# Patient Record
Sex: FEMALE | Race: WHITE | Employment: UNEMPLOYED | URBAN - NONMETROPOLITAN AREA
[De-identification: names, ages, dates, MRNs, and addresses within clinical notes are randomized per-mention and may not be internally consistent; named-entity substitution may affect disease eponyms.]

---

## 2017-01-10 ENCOUNTER — TELEPHONE (OUTPATIENT)
Dept: PSYCHIATRY | Age: 47
End: 2017-01-10

## 2018-03-12 ENCOUNTER — OFFICE VISIT (OUTPATIENT)
Dept: CARDIOLOGY | Age: 48
End: 2018-03-12
Payer: COMMERCIAL

## 2018-03-12 VITALS
WEIGHT: 193 LBS | HEART RATE: 82 BPM | DIASTOLIC BLOOD PRESSURE: 66 MMHG | SYSTOLIC BLOOD PRESSURE: 112 MMHG | BODY MASS INDEX: 31.02 KG/M2 | HEIGHT: 66 IN

## 2018-03-12 DIAGNOSIS — R00.2 PALPITATIONS: ICD-10-CM

## 2018-03-12 DIAGNOSIS — E78.2 MIXED HYPERLIPIDEMIA: ICD-10-CM

## 2018-03-12 DIAGNOSIS — R73.03 PRE-DIABETES: ICD-10-CM

## 2018-03-12 DIAGNOSIS — Z82.49 FAMILY HISTORY OF HEART DISEASE: ICD-10-CM

## 2018-03-12 DIAGNOSIS — R07.9 CHEST PAIN IN ADULT: Primary | ICD-10-CM

## 2018-03-12 LAB — TSH SERPL DL<=0.05 MIU/L-ACNC: 0.8 UIU/ML (ref 0.27–4.2)

## 2018-03-12 PROCEDURE — 93000 ELECTROCARDIOGRAM COMPLETE: CPT | Performed by: CLINICAL NURSE SPECIALIST

## 2018-03-12 PROCEDURE — 99204 OFFICE O/P NEW MOD 45 MIN: CPT | Performed by: CLINICAL NURSE SPECIALIST

## 2018-03-12 RX ORDER — GABAPENTIN 300 MG/1
300 CAPSULE ORAL 4 TIMES DAILY
COMMUNITY
End: 2020-01-04 | Stop reason: ALTCHOICE

## 2018-03-12 RX ORDER — GABAPENTIN 800 MG/1
800 TABLET ORAL 2 TIMES DAILY
COMMUNITY

## 2018-03-12 ASSESSMENT — ENCOUNTER SYMPTOMS
BLURRED VISION: 0
COUGH: 0
ORTHOPNEA: 0
NAUSEA: 0
HEARTBURN: 0
SHORTNESS OF BREATH: 1

## 2018-03-12 NOTE — PROGRESS NOTES
[facility], [address] [facility] [address] [place] [address]  Phone: [phone]  Fax: [phone]    OFFICE VISIT:  3/12/2018    Feliciano Cody - : 1970    Reason For Visit:  Dioni is a 49 y.o. female who is here for New Patient (states she was having chest pain with SOB)    HPI   Patient is referred by her PCP for complaints of tightness in her chest that radiates through to her back and sometimes up to her right neck. She has associated shortness of breath and diaphoresis when this occurs. She feels it more when she is exerting herself. Upon questioning her further she is complaining of fast heart rate and palpitations which seem to be associated with these episodes as well. Episodes can last several minutes. She states palpitations radiate into her throat and then she feels a heaviness. She states in general she is more short of breath and can't climb stairs as easily as she used to. Cardiac risk factors include smoking, prediabetes, hyperlipidemia, and family history of heart disease    Cheyenne Boateng is PCP.   Feliciano Cody has the following history as recorded in [facility]:    Patient Active Problem List    Diagnosis Date Noted    Mixed hyperlipidemia 2018    Pre-diabetes 2018    Family history of heart disease 2018     Past Medical History:   Diagnosis Date    Anxiety     Asthma     Chronic back pain     Depression     Family history of heart disease 3/12/2018    Fibromyalgia     GERD (gastroesophageal reflux disease)     Headache(784.0)     Irritable bowel syndrome     with constipation    Mixed hyperlipidemia 3/12/2018    Neuropathy ([address]     Osteoarthritis     Pre-diabetes 3/12/2018    Type II or unspecified type diabetes mellitus without mention of complication, not stated as uncontrolled      Past Surgical History:   Procedure Laterality Date    APPENDECTOMY      CARDIAC CATHETERIZATION      COLONOSCOPY      GASTRIC BYPASS SURGERY  2003    Dr. Maribell Graham   [address] [facility] [address]      TUBAL LIGATION      UPPER GASTROINTESTINAL ENDOSCOPY       Family History   Problem Relation Age of Onset    Depression Mother     Hypertension Mother     Depression Sister     Heart Disease Father     Heart Attack Paternal Grandmother     Stroke Paternal Grandmother     Heart Attack Paternal Grandfather     Stroke Paternal Grandfather      Social History   Substance Use Topics    Smoking status: Current Every Day Smoker     Packs/day: 2.00    Smokeless tobacco: Never Used    Alcohol use No      Current Outpatient Prescriptions   Medication Sig Dispense Refill    gabapentin (NEURONTIN) 800 MG tablet Take 800 mg by mouth 2 times daily.  gabapentin (NEURONTIN) 300 MG capsule Take 300 mg by mouth 4 times daily.  QUEtiapine (SEROQUEL) 400 MG tablet Take one tablet at bedtime 60 tablet 2    prazosin (MINIPRESS) 1 MG capsule Take 3 capsules at bedtime 90 capsule 2    sertraline (ZOLOFT) 100 MG tablet TAKE 1 AND 1/2 TABLETS BY MOUTH EVERY NIGHT AT BEDTIME 45 tablet 2    albuterol (VENTOLIN HFA) 108 (90 BASE) MCG/ACT inhaler Inhale 2 puffs into the lungs as needed (asthma).  vitamin D (ERGOCALCIFEROL) 50775 UNITS CAPS capsule Take 50,000 Units by mouth once a week.  topiramate (TOPAMAX) 100 MG tablet Take 100 mg by mouth 2 times daily.  traMADol (ULTRAM) 50 MG tablet Take 50 mg by mouth every 6 hours as needed for Pain. No current facility-administered medications for this visit. Allergies: Dilaudid [hydromorphone hcl] and Pcn [penicillins]    Review of Systems  Review of Systems   Constitutional: Negative for chills, fever and malaise/fatigue. HENT: Negative for hearing loss and nosebleeds. Eyes: Negative for blurred vision. Respiratory: Positive for shortness of breath. Negative for cough. Cardiovascular: Positive for chest pain and palpitations.  Negative for orthopnea, leg swelling

## 2018-03-13 ENCOUNTER — TELEPHONE (OUTPATIENT)
Dept: CARDIOLOGY | Age: 48
End: 2018-03-13

## 2018-03-21 ENCOUNTER — HOSPITAL ENCOUNTER (OUTPATIENT)
Dept: NON INVASIVE DIAGNOSTICS | Age: 48
Discharge: HOME | End: 2018-03-21
Payer: COMMERCIAL

## 2018-03-21 ENCOUNTER — TELEPHONE (OUTPATIENT)
Dept: CARDIOLOGY | Age: 48
End: 2018-03-21

## 2018-03-21 DIAGNOSIS — R00.2 PALPITATIONS: ICD-10-CM

## 2018-03-21 DIAGNOSIS — R07.9 CHEST PAIN IN ADULT: ICD-10-CM

## 2018-03-21 LAB
LV EF: 50 %
LV EF: 58 %
LVEF MODALITY: NORMAL
LVEF MODALITY: NORMAL

## 2018-03-21 PROCEDURE — 93306 TTE W/DOPPLER COMPLETE: CPT

## 2018-03-21 PROCEDURE — 93350 STRESS TTE ONLY: CPT

## 2018-03-28 ENCOUNTER — OFFICE VISIT (OUTPATIENT)
Dept: CARDIOLOGY | Age: 48
End: 2018-03-28
Payer: COMMERCIAL

## 2018-03-28 VITALS
WEIGHT: 196 LBS | HEART RATE: 70 BPM | BODY MASS INDEX: 31.5 KG/M2 | DIASTOLIC BLOOD PRESSURE: 62 MMHG | SYSTOLIC BLOOD PRESSURE: 112 MMHG | HEIGHT: 66 IN

## 2018-03-28 DIAGNOSIS — R00.2 PALPITATIONS: ICD-10-CM

## 2018-03-28 DIAGNOSIS — R07.89 OTHER CHEST PAIN: ICD-10-CM

## 2018-03-28 DIAGNOSIS — R94.39 ABNORMAL STRESS ECHO: Primary | ICD-10-CM

## 2018-03-28 DIAGNOSIS — Z01.818 PRE-OP EVALUATION: ICD-10-CM

## 2018-03-28 PROCEDURE — 99213 OFFICE O/P EST LOW 20 MIN: CPT | Performed by: CLINICAL NURSE SPECIALIST

## 2018-03-28 RX ORDER — PNV NO.95/FERROUS FUM/FOLIC AC 28MG-0.8MG
325 TABLET ORAL DAILY
COMMUNITY
End: 2020-12-30

## 2018-03-28 RX ORDER — OMEPRAZOLE 20 MG/1
20 CAPSULE, DELAYED RELEASE ORAL 2 TIMES DAILY
COMMUNITY
Start: 2018-03-24

## 2018-03-28 ASSESSMENT — ENCOUNTER SYMPTOMS
BLURRED VISION: 0
SHORTNESS OF BREATH: 1
ORTHOPNEA: 0
HEARTBURN: 0
COUGH: 0
NAUSEA: 0

## 2018-03-28 NOTE — PATIENT INSTRUCTIONS
of the groin or arm where the catheter will be inserted is shaved, cleaned, and numbed. A needle will be inserted into a blood vessel. A wire will be passed through the needle and into the blood vessel. The wire will then be guided through until it reaches your heart. A soft, flexible catheter tube will then be slipped over the wire and threaded up to your heart. The doctor will be taking x-ray pictures during the procedure to know where the wire and catheter are. Dye will be injected into the arteries of the heart. This will make the arteries and heart show up on the x-ray images. You may feel warm during the dye injection. Insertion of Catheter with Guide Wire    Once in place, the catheter can be used to take measurements. Blood pressure can be taken within the heart's different chambers. Blood samples may also be taken. Multiple x-ray images will be taken to look for any disease in the arteries. An aortogram may also be done at this time. This step will give a clear image of the aorta (large artery leaving the heart). Once all the tests and images are complete, the catheter will be removed. Sometimes, the doctor will perform balloon angioplasty and stenting if he finds an area in your arteries that is narrow or clogged. These are procedures that help to open narrowed arteries. Finally, a bandage will be placed over the groin or arm area. How Long Will It Take? The procedure takes about 1-2 hours. Preparation before the test will take another 1-2 hours. How Much Will It Hurt? Although the procedure is generally not painful, it can cause some discomfort, including:   Burning sensation (when skin at catheter insertion site is anesthetized)   Pressure when catheter is inserted or replaced with other catheters   A flushing feeling or nausea when the dye is injected   Headache   Heart palpitations   Pain medicine will be given when needed.      Average Hospital Stay:  0-1 days     Postoperative Care

## 2018-03-28 NOTE — PROGRESS NOTES
change and focal weakness. Endo/Heme/Allergies: Does not bruise/bleed easily. Psychiatric/Behavioral: Negative for depression. The patient is nervous/anxious. Objective  Vital Signs - /62   Pulse 70   Ht 5' 6\" (1.676 m)   Wt 196 lb (88.9 kg)   BMI 31.64 kg/m²   Physical Exam   Constitutional: She is oriented to person, place, and time. She appears well-developed and well-nourished. No distress. HENT:   Head: Normocephalic and atraumatic. Eyes: Pupils are equal, round, and reactive to light. Right eye exhibits no discharge. Left eye exhibits no discharge. Neck: No JVD present. No tracheal deviation present. Cardiovascular: Normal rate, regular rhythm, normal heart sounds and intact distal pulses. Exam reveals no gallop and no friction rub. No murmur heard. No carotid bruit   Pulmonary/Chest: Effort normal and breath sounds normal. No respiratory distress. She has no wheezes. She has no rales. Abdominal: Soft. There is no tenderness. Musculoskeletal: She exhibits no edema. Normal gait and station   Neurological: She is alert and oriented to person, place, and time. No cranial nerve deficit. Skin: Skin is warm and dry. No rash noted. Psychiatric: She has a normal mood and affect. Her behavior is normal. Judgment normal.   Nursing note and vitals reviewed. Assessment:    1. Abnormal stress echo     2. Other chest pain     3. Palpitations     4. Pre-op evaluation  CBC    Basic Metabolic Panel    XR Chest PA and Lateral     Patient is taking medications as prescribed    Stress Echo 3/21/18  Summary   Positive stress echocardiogram with clinical evidence of myocardial   ischemia. ECG and echocardiographically negative.   Tillman Treadmill Score was 5.0 .  Mil Alberts is a low risk of myocardial ischemia.   Test was supervised by Dr. Natasha Little.     Echo 3/21/18  Summary   Mitral valve leaflets are mildly thickened with preserved leaflet   mobility.   Mild mitral regurgitation is

## 2018-03-29 ENCOUNTER — TELEPHONE (OUTPATIENT)
Dept: CARDIOLOGY | Age: 48
End: 2018-03-29

## 2018-03-29 NOTE — TELEPHONE ENCOUNTER
Called patient again to scheduled cardiac cath and she said she had changed her mind and did not want to have a cardiac catheterization done. Advised her that I would let Juanita People know that she had changed her mind about proceeding with the cath.

## 2020-01-04 ENCOUNTER — HOSPITAL ENCOUNTER (EMERGENCY)
Age: 50
Discharge: HOME | End: 2020-01-05
Attending: EMERGENCY MEDICINE
Payer: COMMERCIAL

## 2020-01-04 ENCOUNTER — APPOINTMENT (OUTPATIENT)
Dept: GENERAL RADIOLOGY | Age: 50
End: 2020-01-04
Payer: COMMERCIAL

## 2020-01-04 LAB
ALBUMIN SERPL-MCNC: 3.7 G/DL (ref 3.5–5.2)
ALP BLD-CCNC: 74 U/L (ref 35–104)
ALT SERPL-CCNC: 10 U/L (ref 5–33)
ANION GAP SERPL CALCULATED.3IONS-SCNC: 12 MMOL/L (ref 7–19)
AST SERPL-CCNC: 12 U/L (ref 5–32)
BILIRUB SERPL-MCNC: <0.2 MG/DL (ref 0.2–1.2)
BILIRUBIN URINE: NEGATIVE
BLOOD, URINE: NEGATIVE
BUN BLDV-MCNC: 7 MG/DL (ref 6–20)
CALCIUM SERPL-MCNC: 7.7 MG/DL (ref 8.6–10)
CHLORIDE BLD-SCNC: 104 MMOL/L (ref 98–111)
CLARITY: CLEAR
CO2: 21 MMOL/L (ref 22–29)
COLOR: YELLOW
CREAT SERPL-MCNC: 0.9 MG/DL (ref 0.5–0.9)
GFR NON-AFRICAN AMERICAN: >60
GLUCOSE BLD-MCNC: 103 MG/DL (ref 74–109)
GLUCOSE URINE: NEGATIVE MG/DL
KETONES, URINE: NEGATIVE MG/DL
LEUKOCYTE ESTERASE, URINE: NEGATIVE
NITRITE, URINE: NEGATIVE
PH UA: 6 (ref 5–8)
POTASSIUM SERPL-SCNC: 3.8 MMOL/L (ref 3.5–5)
PROTEIN UA: NEGATIVE MG/DL
SODIUM BLD-SCNC: 137 MMOL/L (ref 136–145)
SPECIFIC GRAVITY UA: 1.01 (ref 1–1.03)
TOTAL PROTEIN: 6.3 G/DL (ref 6.6–8.7)
URINE REFLEX TO CULTURE: NORMAL
UROBILINOGEN, URINE: 0.2 E.U./DL

## 2020-01-04 PROCEDURE — 81003 URINALYSIS AUTO W/O SCOPE: CPT

## 2020-01-04 PROCEDURE — 80053 COMPREHEN METABOLIC PANEL: CPT

## 2020-01-04 PROCEDURE — 99284 EMERGENCY DEPT VISIT MOD MDM: CPT

## 2020-01-04 PROCEDURE — 71046 X-RAY EXAM CHEST 2 VIEWS: CPT

## 2020-01-04 PROCEDURE — 36415 COLL VENOUS BLD VENIPUNCTURE: CPT

## 2020-01-04 PROCEDURE — 93005 ELECTROCARDIOGRAM TRACING: CPT | Performed by: EMERGENCY MEDICINE

## 2020-01-04 PROCEDURE — 85025 COMPLETE CBC W/AUTO DIFF WBC: CPT

## 2020-01-04 RX ORDER — SODIUM CHLORIDE, SODIUM LACTATE, POTASSIUM CHLORIDE, AND CALCIUM CHLORIDE .6; .31; .03; .02 G/100ML; G/100ML; G/100ML; G/100ML
1000 INJECTION, SOLUTION INTRAVENOUS ONCE
Status: DISCONTINUED | OUTPATIENT
Start: 2020-01-04 | End: 2020-01-05

## 2020-01-04 ASSESSMENT — ENCOUNTER SYMPTOMS
PHOTOPHOBIA: 0
WHEEZING: 0
COLOR CHANGE: 0
SORE THROAT: 1
CHEST TIGHTNESS: 0
VOMITING: 0
BACK PAIN: 0
COUGH: 0
RHINORRHEA: 0
SHORTNESS OF BREATH: 1
DIARRHEA: 0
CONSTIPATION: 0
ABDOMINAL DISTENTION: 0
TROUBLE SWALLOWING: 0
NAUSEA: 0
EYE PAIN: 0
ABDOMINAL PAIN: 0

## 2020-01-05 VITALS
HEIGHT: 67 IN | OXYGEN SATURATION: 93 % | WEIGHT: 200 LBS | RESPIRATION RATE: 13 BRPM | BODY MASS INDEX: 31.39 KG/M2 | DIASTOLIC BLOOD PRESSURE: 62 MMHG | HEART RATE: 72 BPM | SYSTOLIC BLOOD PRESSURE: 104 MMHG | TEMPERATURE: 99 F

## 2020-01-05 LAB
ANISOCYTOSIS: ABNORMAL
BASOPHILS ABSOLUTE: 0.1 K/UL (ref 0–0.2)
BASOPHILS RELATIVE PERCENT: 0.8 % (ref 0–1)
EOSINOPHILS ABSOLUTE: 0 K/UL (ref 0–0.6)
EOSINOPHILS RELATIVE PERCENT: 0.4 % (ref 0–5)
HCT VFR BLD CALC: 38.2 % (ref 37–47)
HEMOGLOBIN: 11 G/DL (ref 12–16)
HYPOCHROMIA: ABNORMAL
IMMATURE GRANULOCYTES #: 0 K/UL
LYMPHOCYTES ABSOLUTE: 1.8 K/UL (ref 1.1–4.5)
LYMPHOCYTES RELATIVE PERCENT: 18.7 % (ref 20–40)
MCH RBC QN AUTO: 22.4 PG (ref 27–31)
MCHC RBC AUTO-ENTMCNC: 28.8 G/DL (ref 33–37)
MCV RBC AUTO: 78 FL (ref 81–99)
MICROCYTES: ABNORMAL
MONOCYTES ABSOLUTE: 0.5 K/UL (ref 0–0.9)
MONOCYTES RELATIVE PERCENT: 4.7 % (ref 0–10)
NEUTROPHILS ABSOLUTE: 7.3 K/UL (ref 1.5–7.5)
NEUTROPHILS RELATIVE PERCENT: 75 % (ref 50–65)
OVALOCYTES: ABNORMAL
PDW BLD-RTO: 18.7 % (ref 11.5–14.5)
PLATELET # BLD: 210 K/UL (ref 130–400)
PLATELET SLIDE REVIEW: ADEQUATE
PMV BLD AUTO: 10.6 FL (ref 9.4–12.3)
POLYCHROMASIA: ABNORMAL
RBC # BLD: 4.9 M/UL (ref 4.2–5.4)
WBC # BLD: 9.7 K/UL (ref 4.8–10.8)

## 2020-01-05 NOTE — ED NOTES
Bed: 17  Expected date:   Expected time:   Means of arrival:   Comments:  EMS: syncope     Alyssa Todd RN  01/04/20 2032

## 2020-01-05 NOTE — ED PROVIDER NOTES
[hydromorphone hcl] and Pcn [penicillins]    FAMILY HISTORY       Family History   Problem Relation Age of Onset    Depression Mother     Hypertension Mother     Depression Sister     Heart Disease Father     Heart Attack Paternal Grandmother     Stroke Paternal Grandmother     Heart Attack Paternal Grandfather     Stroke Paternal Grandfather           SOCIAL HISTORY       Social History     Socioeconomic History    Marital status:      Spouse name: None    Number of children: None    Years of education: None    Highest education level: None   Occupational History    None   Social Needs    Financial resource strain: None    Food insecurity:     Worry: None     Inability: None    Transportation needs:     Medical: None     Non-medical: None   Tobacco Use    Smoking status: Current Every Day Smoker     Packs/day: 2.00    Smokeless tobacco: Never Used   Substance and Sexual Activity    Alcohol use: No    Drug use: No    Sexual activity: None   Lifestyle    Physical activity:     Days per week: None     Minutes per session: None    Stress: None   Relationships    Social connections:     Talks on phone: None     Gets together: None     Attends Gnosticism service: None     Active member of club or organization: None     Attends meetings of clubs or organizations: None     Relationship status: None    Intimate partner violence:     Fear of current or ex partner: None     Emotionally abused: None     Physically abused: None     Forced sexual activity: None   Other Topics Concern    None   Social History Narrative    None       SCREENINGS             PHYSICAL EXAM    (up to 7 for level 4, 8 or more for level 5)     ED Triage Vitals   BP Temp Temp src Pulse Resp SpO2 Height Weight   01/04/20 2032 01/04/20 2035 -- 01/04/20 2032 01/04/20 2032 01/04/20 2032 01/04/20 2032 01/04/20 2032   93/65 99 °F (37.2 °C)  87 17 95 % 5' 7\" (1.702 m) 200 lb (90.7 kg)       Physical Exam  Vitals signs and nursing note reviewed. Constitutional:       General: She is not in acute distress. Appearance: Normal appearance. HENT:      Head: Normocephalic and atraumatic. Nose: Nose normal.      Mouth/Throat:      Mouth: Mucous membranes are moist.      Pharynx: Oropharynx is clear. Eyes:      Extraocular Movements: Extraocular movements intact. Pupils: Pupils are equal, round, and reactive to light. Cardiovascular:      Rate and Rhythm: Normal rate and regular rhythm. Heart sounds: No murmur. Pulmonary:      Effort: Pulmonary effort is normal.      Breath sounds: Normal breath sounds. No wheezing, rhonchi or rales. Abdominal:      General: Abdomen is flat. Palpations: Abdomen is soft. Tenderness: There is no tenderness. There is no guarding or rebound. Skin:     General: Skin is warm and dry. Capillary Refill: Capillary refill takes less than 2 seconds. Neurological:      General: No focal deficit present. Mental Status: She is alert. She is disoriented. Cranial Nerves: No cranial nerve deficit. Psychiatric:         Mood and Affect: Mood normal.         Behavior: Behavior normal.         Thought Content: Thought content normal.         DIAGNOSTIC RESULTS     EKG: All EKG's areinterpreted by the Emergency Department Physician who either signs or Co-signs this chart in the absence of a cardiologist.    Normal sinus rhythm with a rate 82, normal axis, no acute ST elevations or depressions. RADIOLOGY:  Non-plain film images such as CT, Ultrasound and MRI are read by the radiologist. Plain radiographic images are visualized and preliminarily interpreted bythe emergency physician with the below findings:      XR CHEST STANDARD (2 VW)   Final Result   1. Slightly diminished level of inspiration with mild basilar   atelectasis, otherwise no acute process identified.    Signed by Dr Drew Kaur on 1/4/2020 8:56 PM              LABS:  Labs Reviewed   CBC WITH AUTO DIFFERENTIAL - Abnormal; Notable for the following components:       Result Value    Hemoglobin 11.0 (*)     MCV 78.0 (*)     MCH 22.4 (*)     MCHC 28.8 (*)     RDW 18.7 (*)     Neutrophils % 75.0 (*)     Lymphocytes % 18.7 (*)     All other components within normal limits   COMPREHENSIVE METABOLIC PANEL - Abnormal; Notable for the following components:    CO2 21 (*)     Calcium 7.7 (*)     Total Protein 6.3 (*)     All other components within normal limits   URINE RT REFLEX TO CULTURE       All other labs were within normal range or not returned as of this dictation. EMERGENCY DEPARTMENT COURSE and DIFFERENTIAL DIAGNOSIS/MDM:   Vitals:    Vitals:    01/04/20 2032 01/04/20 2035 01/04/20 2241 01/04/20 2302   BP: 93/65  92/61 (!) 103/50   Pulse: 87   69   Resp: 17   16   Temp:  99 °F (37.2 °C)     SpO2: 95%   95%   Weight: 200 lb (90.7 kg)      Height: 5' 7\" (1.702 m)          MDM  Number of Diagnoses or Management Options  Syncope and collapse: new and requires workup  Diagnosis management comments: Pt c/o syncope. The w/u this evening was essentially unremarkable. EKG and CXR did not show any acute process. Pt was found to be mildly hypotensive, but this improved with fluids. She was able to make it to the bathroom without assistance. Patient experienced no other symptoms such as dizziness, lightheadedness or other sensation like she was going to pass out. I discussed the results of the work-up this evening with her and her family. After careful review of history, physical, and supporting data, there is very low suspicion for a life or limb threatening cause of the patients syncope. The patient's symptoms have improved from presentation and appears clinically well. Patient reexamined prior to discharge and appears improved. Patient has been encouraged to follow up with his/her PCP and to return to the ED with any lack of improvement or worsening symptoms.  The patient';s questions regarding the work up and plan were invited and answered. The patient/guardian states understanding and agreement with the plan. Patient Progress  Patient progress: stable      Reassessment      CONSULTS:  None    PROCEDURES:  Unless otherwise noted below, none     Procedures    FINAL IMPRESSION      1.  Syncope and collapse          DISPOSITION/PLAN   DISPOSITION        PATIENT REFERRED TO:  [address] [place]  [phone]    Call in 3 days  For follow up, As needed      DISCHARGE MEDICATIONS:  New Prescriptions    No medications on file          (Please note that portions of this note were completed with a voice recognition program.  Efforts were made to edit thedictations but occasionally words are mis-transcribed.)    Aj Arguello MD (electronically signed)  Attending Emergency Physician          Lakia Jaffe MD  01/05/20 0021

## 2020-01-07 LAB
EKG P AXIS: 52 DEGREES
EKG P-R INTERVAL: 130 MS
EKG Q-T INTERVAL: 400 MS
EKG QRS DURATION: 98 MS
EKG QTC CALCULATION (BAZETT): 436 MS
EKG T AXIS: 37 DEGREES

## 2020-01-07 PROCEDURE — 93010 ELECTROCARDIOGRAM REPORT: CPT | Performed by: INTERNAL MEDICINE

## 2020-11-17 ENCOUNTER — TELEPHONE (OUTPATIENT)
Dept: NEUROSURGERY | Age: 50
End: 2020-11-17

## 2020-12-30 ENCOUNTER — OFFICE VISIT (OUTPATIENT)
Dept: NEUROSURGERY | Age: 50
End: 2020-12-30
Payer: COMMERCIAL

## 2020-12-30 VITALS
HEART RATE: 68 BPM | OXYGEN SATURATION: 96 % | BODY MASS INDEX: 31.39 KG/M2 | HEIGHT: 67 IN | WEIGHT: 200 LBS | DIASTOLIC BLOOD PRESSURE: 74 MMHG | SYSTOLIC BLOOD PRESSURE: 122 MMHG

## 2020-12-30 DIAGNOSIS — R55 NEAR SYNCOPE: ICD-10-CM

## 2020-12-30 DIAGNOSIS — R42 DIZZINESS: ICD-10-CM

## 2020-12-30 DIAGNOSIS — R41.3 MEMORY LOSS: ICD-10-CM

## 2020-12-30 DIAGNOSIS — M54.41 CHRONIC BILATERAL LOW BACK PAIN WITH BILATERAL SCIATICA: ICD-10-CM

## 2020-12-30 DIAGNOSIS — R20.0 NUMBNESS: ICD-10-CM

## 2020-12-30 DIAGNOSIS — G89.29 CHRONIC BILATERAL LOW BACK PAIN WITH BILATERAL SCIATICA: ICD-10-CM

## 2020-12-30 DIAGNOSIS — M54.42 CHRONIC BILATERAL LOW BACK PAIN WITH BILATERAL SCIATICA: ICD-10-CM

## 2020-12-30 DIAGNOSIS — M54.2 NECK PAIN: ICD-10-CM

## 2020-12-30 LAB
C-REACTIVE PROTEIN: 0.05 MG/DL (ref 0–0.5)
HIV-1 P24 AG: NORMAL
RAPID HIV 1&2: NORMAL
RHEUMATOID FACTOR: <10 IU/ML
SEDIMENTATION RATE, ERYTHROCYTE: 8 MM/HR (ref 0–25)
T4 FREE: 0.8 NG/DL (ref 0.93–1.7)
TSH SERPL DL<=0.05 MIU/L-ACNC: 0.92 UIU/ML (ref 0.27–4.2)
VITAMIN B-12: 242 PG/ML (ref 211–946)

## 2020-12-30 PROCEDURE — 99204 OFFICE O/P NEW MOD 45 MIN: CPT | Performed by: PSYCHIATRY & NEUROLOGY

## 2020-12-30 RX ORDER — FOLIC ACID 1 MG/1
1 TABLET ORAL DAILY
COMMUNITY

## 2020-12-30 NOTE — PROGRESS NOTES
[facility] Neurology Office Note      Patient:   Raquel Umana  MR#:    [id number]  Account Number:                         YOB: 1970  Date of Evaluation:  12/30/2020  Time of Note:                          2:22 PM  Primary/Referring Physician:  Trenton Pizano MD  Consulting Physician:  Sanjuana Humphries DO    NEW PATIENT CONSULTATION    Chief Complaint   Patient presents with    Memory Loss     New patient referral patient stated that she can't remember half her life from her marriage, her childrens birth , to even jesus past chreistmas     Migraine    Dizziness     Also so fainting when becomes dizzy     Numbness     Also some burning and itching in her legs       HISTORY OF PRESENT ILLNESS    Raquel Umana is a 45y.o. year old female here for memory loss, dizziness, headaches, and numbness. Patient has been noting memory loss which seems to be progressively worsening. Patient states that her memory loss occurred suddenly. Woke up one day and could not remember familiar people or events. Short term is also effected. Also having some intermittent speech difficulty as well. Patient has also been noting pain and numbness in the lower extremities. She notes intermittent headaches as well. Prior head CT, CBC, CMP largely negative. Notes some intermittent dizziness as well, no chest pain, palpitations. Possible syncopal event a few months ago noted. Some numbness noted as well in the legs and arms as well. No neck or back pain. Depression and anxiety history noted.        Past Medical History:   Diagnosis Date    Anxiety     Asthma     Chronic back pain     Depression     Family history of heart disease 3/12/2018    Fibromyalgia     GERD (gastroesophageal reflux disease)     Headache(784.0)     Irritable bowel syndrome     with constipation    Mixed hyperlipidemia 3/12/2018    Neuropathy     Osteoarthritis     Pre-diabetes 3/12/2018       Past Surgical History: Procedure Laterality Date    APPENDECTOMY      CARDIAC CATHETERIZATION      COLONOSCOPY      GASTRIC BYPASS SURGERY  2003    Dr. Prieto [place]      TUBAL LIGATION      UPPER GASTROINTESTINAL ENDOSCOPY         Family History   Problem Relation Age of Onset    Depression Mother     Hypertension Mother     Depression Sister     Heart Disease Father     Heart Attack Paternal Grandmother     Stroke Paternal Grandmother     Heart Attack Paternal Grandfather     Stroke Paternal Grandfather        Social History     Socioeconomic History    Marital status:      Spouse name: Not on file    Number of children: Not on file    Years of education: Not on file    Highest education level: Not on file   Occupational History    Not on file   Social Needs    Financial resource strain: Not on file    Food insecurity     Worry: Not on file     Inability: Not on file    Transportation needs     Medical: Not on file     Non-medical: Not on file   Tobacco Use    Smoking status: Current Every Day Smoker     Packs/day: 2.00    Smokeless tobacco: Never Used   Substance and Sexual Activity    Alcohol use: No    Drug use: No    Sexual activity: Not Currently     Partners: Male   Lifestyle    Physical activity     Days per week: Not on file     Minutes per session: Not on file    Stress: Not on file   Relationships    Social connections     Talks on phone: Not on file     Gets together: Not on file     Attends [employer] service: Not on file     Active member of club or organization: Not on file     Attends meetings of clubs or organizations: Not on file     Relationship status: Not on file    Intimate partner violence     Fear of current or ex partner: Not on file     Emotionally abused: Not on file     Physically abused: Not on file     Forced sexual activity: Not on file   Other Topics Concern    Not on file   Social History Narrative    Not on file Current Outpatient Medications   Medication Sig Dispense Refill    topiramate (TOPAMAX) 200 MG tablet Take 250 mg by mouth 2 times daily      folic acid (FOLVITE) 1 MG tablet Take 1 mg by mouth daily      omeprazole (PRILOSEC) 20 MG delayed release capsule Take 20 mg by mouth 2 times daily      gabapentin (NEURONTIN) 800 MG tablet Take 800 mg by mouth 2 times daily.  QUEtiapine (SEROQUEL) 400 MG tablet Take one tablet at bedtime 60 tablet 2    sertraline (ZOLOFT) 100 MG tablet TAKE 1 AND 1/2 TABLETS BY MOUTH EVERY NIGHT AT BEDTIME 45 tablet 2     No current facility-administered medications for this visit.         Allergies   Allergen Reactions    Dilaudid [Hydromorphone Hcl]      Shut down bowel    Pcn [Penicillins]      Hives, high fever         REVIEW OF SYSTEMS    Constitutional: []Fever [x]Sweat []Chills [] Recent Injury [x] Denies all unless marked  HEENT:[x]Headache  [] Head Injury/Hearing Loss  [] Sore Throat  [] Ear Ache/Dizziness  [x] Denies all unless marked  Spine:  [] Neck pain  [] Back pain  [] Sciaticia  [x] Denies all unless marked  Cardiovascular:[]Heart Disease []Chest Pain [] Palpitations  [x] Denies all unless marked  Pulmonary: []Shortness of Breath []Cough   [x] Denies all unless marke  Gastrointestinal: []Nausea  []Vomiting  []Abdominal Pain  []Constipation  []Diarrhea  []Dark Bloody Stools  [x] Denies all unless marked  Psychiatric/Behavioral:[] Depression [x] Anxiety [x] Denies all unless marked  Genitourinary:   [] Frequency  [] Urgency  [] Incontinence [] Pain with Urination  [x] Denies all unless marked  Extremities: []Pain  []Swelling  [x] Denies all unless marked  Musculoskeletal: [x] Muscle Pain  [] Joint Pain  [] Arthritis [x] Muscle Cramps [] Muscle Twitches  [x] Denies all unless marked  Sleep: [x] Insomnia [] Snoring [] Restless Legs [] Sleep Apnea  [] Daytime Sleepiness  [x] Denies all unless marked  Skin:[] Rash [] Skin Discoloration [x] Denies all unless marked Neurological: [x]Visual Disturbance/Memory Loss [] Loss of Balance [] Slurred Speech/Weakness [] Seizures  [x] Vertigo/Dizziness [x] Denies all unless marked     I have reviewed the above ROS with the patient and agree with the ROS as documented above. PHYSICAL EXAM    Constitutional    /74   Pulse 68   Ht 5' 7\" (1.702 m)   Wt 200 lb (90.7 kg)   SpO2 96%   BMI 31.32 kg/m²   General appearance: No acute distress   EYES -   Conjunctiva normal  Pupillary exam as below, see CN exam in the neurologic exam  ENT-    No scars, masses, or lesions over external nose or ears  Hearing normal bilaterally to finger rub  Cardiovascular -   No clubbing, cyanosis, or edema   Pulmonary-   Good expansion, normal effort without use of accessory muscles  Musculoskeletal    No significant wasting of muscles noted  Gait as below, see gait exam in the neurologic exam  Muscle strength, tone, stability as below. No bony deformities  Skin    Warm, dry, and intact to inspection and palpation. No rash, erythema, or pallor  Psychiatric    Mood, affect, and behavior appear normal    Memory as below see mental status examination in the neurologic exam    NEUROLOGICAL EXAM    Mental status   [x]Awake, alert, oriented   [x]Affect attention and concentration appear appropriate  []Recent and remote memory appears unremarkable  [x]Speech normal without dysarthria or aphasia, comprehension and repetition intact.    COMMENTS: Mild cognitive difficulty noted, speech normal.     Cranial Nerves [x]No VF deficit to confrontation,  no papilledema on fundoscopic exam.  [x]PERRLA, EOMI, no nystagmus, conjugate eye movements, no ptosis  [x]Face symmetric  [x]Facial sensation intact  [x]Tongue midline no atrophy or fasciculations present  [x]Palate midline, hearing to finger rub normal bilaterally  [x]Shoulder shrug and SCM testing normal bilaterally  COMMENTS:   Motor   [x]5/5 strength x 4 extremities  [x]Normal bulk and tone [x]No tremor present  [x]No rigidity or bradykinesia noted  COMMENTS:   Sensory  [x]Sensation intact to light touch, pin prick, vibration, and proprioception BLE  []Sensation intact to light touch, pin prick, vibration, and proprioception BUE  COMMENTS:   Coordination [x]FTN normal bilaterally   []HTS normal bilaterally  []TAQUERIA normal bilaterally. COMMENTS:   Reflexes  [x]Symmetric and non-pathological  [x]Toes down going bilaterally  [x]No clonus present  COMMENTS:   Gait                  [x]Normal steady gait    []Ataxic    []Spastic     []Magnetic     []Shuffling  COMMENTS:       LABS RECORD AND IMAGING REVIEW (As below and per HPI)      Lab Results   Component Value Date    WBC 9.7 01/04/2020    HGB 11.0 (L) 01/04/2020    HCT 38.2 01/04/2020    MCV 78.0 (L) 01/04/2020     01/04/2020     Lab Results   Component Value Date     01/04/2020    K 3.8 01/04/2020     01/04/2020    CO2 21 (L) 01/04/2020    BUN 7 01/04/2020    CREATININE 0.9 01/04/2020    GLUCOSE 103 01/04/2020    CALCIUM 7.7 (L) 01/04/2020    PROT 6.3 (L) 01/04/2020    LABALBU 3.7 01/04/2020    BILITOT <0.2 01/04/2020    ALKPHOS 74 01/04/2020    AST 12 01/04/2020    ALT 10 01/04/2020    LABGLOM >60 01/04/2020     Primary records reviewed. CT head reviewed, nothing acute. Xr Chest Standard (2 Vw)    Result Date: 1/4/2020  HISTORY: Syncope CXR: 2 views of the chest are obtained. COMPARISON: None. FINDINGS: There is a diminished level inspiration with mild basilar atelectasis. No consolidation or edema. The cardiac mediastinal contours are normal. No Pleural effusion or pneumothorax. Mild degenerative change of the thoracic spine. 1. Slightly diminished level of inspiration with mild basilar atelectasis, otherwise no acute process identified.  Signed by Dr Hoang Montague on 1/4/2020 8:56 PM      ASSESSMENT: Yeison Grigsby is a 45y.o. year old female here for memory loss, dizziness, prior syncopal event, widespread numbness, chronic neck and back pain, anxiety / depression and headaches. Differential is broad, plan further workup to try and clarify the source of her symptoms. Topamax may be playing a role with her cognitive difficulty, currently on 250 mg bid. PLAN:  1. MRI brain, C/L-spine  2. EEG  3. Additional labs  4. NCS/EMG  5. ECHO, Holter  6. Will wean topamax to ensure is not playing a role with cognitive loss. Consider Aimovig if headaches worsen. 7.  Maximize treatment of anxiety and depression through primary, on seroquel and zoloft.    8.  Further treatment / workup pending above      Seymour Gore, DO  Board Certified Neurology

## 2020-12-31 ENCOUNTER — HOSPITAL ENCOUNTER (OUTPATIENT)
Dept: NON INVASIVE DIAGNOSTICS | Age: 50
Discharge: HOME | End: 2020-12-31
Payer: COMMERCIAL

## 2020-12-31 ENCOUNTER — HOSPITAL ENCOUNTER (OUTPATIENT)
Dept: NEUROLOGY | Age: 50
Discharge: HOME | End: 2020-12-31
Payer: COMMERCIAL

## 2020-12-31 DIAGNOSIS — G89.29 CHRONIC BILATERAL LOW BACK PAIN WITH BILATERAL SCIATICA: ICD-10-CM

## 2020-12-31 DIAGNOSIS — M54.42 CHRONIC BILATERAL LOW BACK PAIN WITH BILATERAL SCIATICA: ICD-10-CM

## 2020-12-31 DIAGNOSIS — R42 DIZZINESS: ICD-10-CM

## 2020-12-31 DIAGNOSIS — R20.0 NUMBNESS: ICD-10-CM

## 2020-12-31 DIAGNOSIS — M54.41 CHRONIC BILATERAL LOW BACK PAIN WITH BILATERAL SCIATICA: ICD-10-CM

## 2020-12-31 DIAGNOSIS — M54.2 NECK PAIN: ICD-10-CM

## 2020-12-31 DIAGNOSIS — R41.3 MEMORY LOSS: ICD-10-CM

## 2020-12-31 DIAGNOSIS — R55 NEAR SYNCOPE: ICD-10-CM

## 2020-12-31 LAB
LV EF: 58 %
LVEF MODALITY: NORMAL
RPR: NORMAL

## 2020-12-31 PROCEDURE — 95913 NRV CNDJ TEST 13/> STUDIES: CPT

## 2020-12-31 PROCEDURE — 95886 MUSC TEST DONE W/N TEST COMP: CPT | Performed by: PSYCHIATRY & NEUROLOGY

## 2020-12-31 PROCEDURE — 95913 NRV CNDJ TEST 13/> STUDIES: CPT | Performed by: PSYCHIATRY & NEUROLOGY

## 2020-12-31 PROCEDURE — 6360000004 HC RX CONTRAST MEDICATION: Performed by: INTERNAL MEDICINE

## 2020-12-31 PROCEDURE — 93226 XTRNL ECG REC<48 HR SCAN A/R: CPT

## 2020-12-31 PROCEDURE — C8929 TTE W OR WO FOL WCON,DOPPLER: HCPCS

## 2020-12-31 PROCEDURE — 95886 MUSC TEST DONE W/N TEST COMP: CPT

## 2020-12-31 PROCEDURE — 93225 XTRNL ECG REC<48 HRS REC: CPT

## 2020-12-31 RX ORDER — DIAZEPAM 5 MG/1
TABLET ORAL
Qty: 3 TABLET | Refills: 0 | Status: SHIPPED | OUTPATIENT
Start: 2020-12-31 | End: 2021-02-08

## 2020-12-31 RX ADMIN — PERFLUTREN 1.65 MG: 6.52 INJECTION, SUSPENSION INTRAVENOUS at 09:14

## 2021-01-01 LAB
ANA IGG, ELISA: NORMAL
LYME (B. BURGDORFERI) AB IGG WB: NEGATIVE
LYME AB IGM BY WB:: NEGATIVE

## 2021-01-02 LAB
ARSENIC BLOOD: <10 UG/L
LEAD LEVEL BLOOD: <2 UG/DL
MERCURY BLOOD: <2.5 UG/L

## 2021-01-07 NOTE — PROCEDURES
Holter monitor report    Date of testing 12/31/2020    Date of analysis 1/4/2021    Summary impressions:    1. Sinus rhythm minimal heart rate 53 average 84 maximal 128    2. No episodes of ST segment depression were recorded    3. Longest R to R interval 1.3 seconds    4. No episodes of atrial fibrillation were recorded    5. Rare PVCs 20-40 540.9% no runs    6. Rare supraventricular ectopy 0.0% no runs    7. 2 minutes 55 seconds bradycardia slowest heart rate 53    8. 6 hours 4 minutes 5 seconds tachycardia fastest heart rate recorded 128 bpm    9. Symptoms recorded during monitoring included feeling extremely hot, diaphoresis, shortness of breath, dry mouth dizziness and headaches. no hemoptysis/no pleuritic chest pain/no cough/no wheezing

## 2021-02-06 DIAGNOSIS — F41.9 ANXIETY: ICD-10-CM

## 2021-02-08 RX ORDER — DIAZEPAM 5 MG/1
TABLET ORAL
Qty: 3 TABLET | Refills: 0 | Status: SHIPPED | OUTPATIENT
Start: 2021-02-08 | End: 2021-03-10

## 2021-02-08 NOTE — TELEPHONE ENCOUNTER
Requested Prescriptions     Pending Prescriptions Disp Refills    diazePAM (VALIUM) 5 MG tablet [Pharmacy Med Name: DIAZEPAM TAB 5MG TABLET] 3 tablet      Sig: TAKE 1 TABLET 30 MINUTES PRIOR TO MRIS       Last Office Visit:  12/30/2020  Next Office Visit:  3/8/2021  Last Medication Refill:  12/31/2020  Arnold  up to date:  2/8/2021    *RX updated to reflect   2/8/2021  fill date*

## 2021-03-12 ENCOUNTER — TELEPHONE (OUTPATIENT)
Dept: NEUROSURGERY | Age: 51
End: 2021-03-12

## 2021-03-12 NOTE — TELEPHONE ENCOUNTER
Called to r/s missed appt from 3/8. Left voicemail with call back number [phone]. Also mentioned she needs to get her MRI's r/s before her follow up since those were missed on 1/26/21.

## 2021-03-16 ENCOUNTER — TELEPHONE (OUTPATIENT)
Dept: NEUROSURGERY | Age: 51
End: 2021-03-16

## 2021-03-16 NOTE — TELEPHONE ENCOUNTER
----- Message from [facility] sent at 3/12/2021  3:15 PM CST -----  [place] Mixer to r/s patients missed appointment and saw she hadn't had the MRI's done. Called her and she would like to know the results of the NCV, Holter monitor and echo before she goes thru with the MRI's. She is scheduled for a follow up on 4/28/21.  But can r/s if MRI's need to done first.       Thanks,   Cayetano

## 2021-04-29 ENCOUNTER — TELEPHONE (OUTPATIENT)
Dept: NEUROSURGERY | Age: 51
End: 2021-04-29

## 2021-04-29 NOTE — TELEPHONE ENCOUNTER
Left v/m for pt to contact office if need to r/s soy. Pt must also r/s and complete test mri x3 before dr couch  appt.

## 2023-01-05 ENCOUNTER — HOSPITAL ENCOUNTER (OUTPATIENT)
Dept: PAIN MANAGEMENT | Age: 53
Discharge: HOME | End: 2023-01-05
Payer: COMMERCIAL

## 2023-01-05 VITALS
RESPIRATION RATE: 16 BRPM | HEIGHT: 66 IN | DIASTOLIC BLOOD PRESSURE: 67 MMHG | TEMPERATURE: 99.5 F | HEART RATE: 76 BPM | WEIGHT: 214 LBS | SYSTOLIC BLOOD PRESSURE: 110 MMHG | BODY MASS INDEX: 34.39 KG/M2 | OXYGEN SATURATION: 98 %

## 2023-01-05 DIAGNOSIS — R20.2 BILATERAL LEG PARESTHESIA: ICD-10-CM

## 2023-01-05 DIAGNOSIS — R20.8 ALLODYNIA: ICD-10-CM

## 2023-01-05 DIAGNOSIS — R20.0 NUMBNESS: ICD-10-CM

## 2023-01-05 DIAGNOSIS — G62.9 POLYNEUROPATHY: ICD-10-CM

## 2023-01-05 DIAGNOSIS — Z02.89 PAIN MEDICATION AGREEMENT SIGNED: ICD-10-CM

## 2023-01-05 DIAGNOSIS — R20.2 POSITIVE PHALEN MANEUVER: ICD-10-CM

## 2023-01-05 DIAGNOSIS — R29.898 RIGHT HAND WEAKNESS: Primary | ICD-10-CM

## 2023-01-05 DIAGNOSIS — G89.4 CHRONIC PAIN SYNDROME: ICD-10-CM

## 2023-01-05 LAB
ALBUMIN SERPL-MCNC: 4.2 G/DL (ref 3.5–5.2)
ALP BLD-CCNC: 56 U/L (ref 35–104)
ALT SERPL-CCNC: 28 U/L (ref 5–33)
ANION GAP SERPL CALCULATED.3IONS-SCNC: 13 MMOL/L (ref 7–19)
AST SERPL-CCNC: 29 U/L (ref 5–32)
BILIRUB SERPL-MCNC: 0.3 MG/DL (ref 0.2–1.2)
BUN BLDV-MCNC: 13 MG/DL (ref 6–20)
C-REACTIVE PROTEIN: <0.3 MG/DL (ref 0–0.5)
CALCIUM SERPL-MCNC: 8.7 MG/DL (ref 8.6–10)
CHLORIDE BLD-SCNC: 99 MMOL/L (ref 98–111)
CO2: 25 MMOL/L (ref 22–29)
CREAT SERPL-MCNC: 1.1 MG/DL (ref 0.5–0.9)
FOLATE: >20 NG/ML (ref 4.8–37.3)
GFR SERPL CREATININE-BSD FRML MDRD: 60 ML/MIN/{1.73_M2}
GLUCOSE BLD-MCNC: 104 MG/DL (ref 74–109)
HBA1C MFR BLD: 5.7 % (ref 4–6)
HCT VFR BLD CALC: 41.2 % (ref 37–47)
HEMOGLOBIN: 14 G/DL (ref 12–16)
MCH RBC QN AUTO: 32.3 PG (ref 27–31)
MCHC RBC AUTO-ENTMCNC: 34 G/DL (ref 33–37)
MCV RBC AUTO: 95.2 FL (ref 81–99)
PDW BLD-RTO: 12.8 % (ref 11.5–14.5)
PLATELET # BLD: 150 K/UL (ref 130–400)
PMV BLD AUTO: 11.5 FL (ref 9.4–12.3)
POTASSIUM SERPL-SCNC: 3.4 MMOL/L (ref 3.5–5)
RBC # BLD: 4.33 M/UL (ref 4.2–5.4)
SEDIMENTATION RATE, ERYTHROCYTE: 4 MM/HR (ref 0–25)
SODIUM BLD-SCNC: 137 MMOL/L (ref 136–145)
T3 FREE: 1.9 PG/ML (ref 2–4.4)
TOTAL PROTEIN: 6.5 G/DL (ref 6.6–8.7)
TSH REFLEX FT4: 0.49 UIU/ML (ref 0.35–5.5)
VITAMIN B-12: 378 PG/ML (ref 211–946)
VITAMIN D 25-HYDROXY: 67.7 NG/ML
WBC # BLD: 4.1 K/UL (ref 4.8–10.8)

## 2023-01-05 PROCEDURE — 84165 PROTEIN E-PHORESIS SERUM: CPT

## 2023-01-05 PROCEDURE — 82746 ASSAY OF FOLIC ACID SERUM: CPT

## 2023-01-05 PROCEDURE — 84443 ASSAY THYROID STIM HORMONE: CPT

## 2023-01-05 PROCEDURE — 84481 FREE ASSAY (FT-3): CPT

## 2023-01-05 PROCEDURE — 84155 ASSAY OF PROTEIN SERUM: CPT

## 2023-01-05 PROCEDURE — 86038 ANTINUCLEAR ANTIBODIES: CPT

## 2023-01-05 PROCEDURE — 99215 OFFICE O/P EST HI 40 MIN: CPT

## 2023-01-05 PROCEDURE — 85027 COMPLETE CBC AUTOMATED: CPT

## 2023-01-05 PROCEDURE — 82306 VITAMIN D 25 HYDROXY: CPT

## 2023-01-05 PROCEDURE — 85652 RBC SED RATE AUTOMATED: CPT

## 2023-01-05 PROCEDURE — 80053 COMPREHEN METABOLIC PANEL: CPT

## 2023-01-05 PROCEDURE — 83036 HEMOGLOBIN GLYCOSYLATED A1C: CPT

## 2023-01-05 PROCEDURE — 86140 C-REACTIVE PROTEIN: CPT

## 2023-01-05 PROCEDURE — 82607 VITAMIN B-12: CPT

## 2023-01-05 PROCEDURE — 36415 COLL VENOUS BLD VENIPUNCTURE: CPT

## 2023-01-05 RX ORDER — LISINOPRIL AND HYDROCHLOROTHIAZIDE 12.5; 1 MG/1; MG/1
1 TABLET ORAL DAILY
COMMUNITY
Start: 2022-12-09

## 2023-01-05 RX ORDER — BUSPIRONE HYDROCHLORIDE 10 MG/1
1 TABLET ORAL 2 TIMES DAILY PRN
COMMUNITY
Start: 2022-12-09

## 2023-01-05 RX ORDER — IBUPROFEN 800 MG/1
800 TABLET ORAL EVERY 8 HOURS PRN
Qty: 90 TABLET | Refills: 2 | Status: SHIPPED | OUTPATIENT
Start: 2023-01-05

## 2023-01-05 RX ORDER — M-VIT,TX,IRON,MINS/CALC/FOLIC 27MG-0.4MG
1 TABLET ORAL DAILY
COMMUNITY

## 2023-01-05 RX ORDER — GABAPENTIN 800 MG/1
800 TABLET ORAL 2 TIMES DAILY
Qty: 60 TABLET | Refills: 2 | Status: SHIPPED | OUTPATIENT
Start: 2023-01-05 | End: 2023-04-05

## 2023-01-05 RX ORDER — ACETAMINOPHEN 325 MG/1
650 TABLET ORAL EVERY 4 HOURS PRN
COMMUNITY

## 2023-01-05 RX ORDER — TOPIRAMATE 100 MG/1
4 TABLET, FILM COATED ORAL DAILY
COMMUNITY
Start: 2022-12-09

## 2023-01-05 RX ORDER — GABAPENTIN 400 MG/1
400 CAPSULE ORAL DAILY
COMMUNITY
End: 2023-01-05 | Stop reason: ALTCHOICE

## 2023-01-05 RX ORDER — ROSUVASTATIN CALCIUM 20 MG/1
1 TABLET, COATED ORAL DAILY
COMMUNITY
Start: 2022-12-09

## 2023-01-05 RX ORDER — CETIRIZINE HYDROCHLORIDE 10 MG/1
10 TABLET ORAL DAILY
COMMUNITY

## 2023-01-05 RX ORDER — IBUPROFEN 800 MG/1
1 TABLET ORAL 3 TIMES DAILY
COMMUNITY
Start: 2022-12-26

## 2023-01-05 RX ORDER — TRAMADOL HYDROCHLORIDE 50 MG/1
50 TABLET ORAL EVERY 6 HOURS PRN
COMMUNITY
End: 2023-01-05 | Stop reason: ALTCHOICE

## 2023-01-05 RX ORDER — COVID-19 ANTIGEN TEST
KIT MISCELLANEOUS PRN
COMMUNITY
End: 2023-01-05 | Stop reason: ALTCHOICE

## 2023-01-05 RX ORDER — LANOLIN ALCOHOL/MO/W.PET/CERES
3 CREAM (GRAM) TOPICAL NIGHTLY PRN
COMMUNITY

## 2023-01-05 RX ORDER — ESCITALOPRAM OXALATE 20 MG/1
1 TABLET ORAL DAILY
COMMUNITY
Start: 2022-12-09

## 2023-01-05 RX ORDER — TRAMADOL HYDROCHLORIDE 50 MG/1
50 TABLET ORAL EVERY 6 HOURS PRN
Qty: 120 TABLET | Refills: 2 | Status: SHIPPED | OUTPATIENT
Start: 2023-01-05 | End: 2023-04-05

## 2023-01-05 RX ORDER — GABAPENTIN 400 MG/1
400 CAPSULE ORAL DAILY
Qty: 30 CAPSULE | Refills: 2 | Status: SHIPPED | OUTPATIENT
Start: 2023-01-05 | End: 2023-04-05

## 2023-01-05 ASSESSMENT — PAIN DESCRIPTION - LOCATION
LOCATION: BACK
LOCATION_2: LEG

## 2023-01-05 ASSESSMENT — PAIN DESCRIPTION - ORIENTATION
ORIENTATION: LOWER
ORIENTATION_2: RIGHT;LEFT;LOWER

## 2023-01-05 ASSESSMENT — PAIN SCALES - GENERAL: PAINLEVEL_OUTOF10: 8

## 2023-01-05 ASSESSMENT — PAIN DESCRIPTION - INTENSITY: RATING_2: 10

## 2023-01-05 ASSESSMENT — PAIN DESCRIPTION - PAIN TYPE: TYPE: CHRONIC PAIN

## 2023-01-05 NOTE — PROGRESS NOTES
Clinic Documentation      Education Provided:  [x] Review of [facility]rope[facility] [place]  [x] Agreement Review  [x] PEG Score Calculated [x] PHQ Score Calculated [x] ORT Score Calculated    [] Compliance Issues Discussed [] Cognitive Behavior Needs [x] Exercise [] Review of Test [] Financial Issues  [x] Tobacco/Alcohol Use Reviewed [x] Teaching [x] New Patient [x] Picture Obtained    Physician Plan:  [] Outgoing Referral  [] Pharmacy Consult  [x] Test Ordered [x] Prescription Ordered/Changed   [] Obtained Test Results / Consult Notes        Complete if patient is withholding blood thinner for procedure     Blood Thinner Patient is currently taking:      [] Plavix (Hold for 7 days)  [] Aspirin (Hold for 5 days)     [] Pletal (Hold for 2 days)  [] Pradaxa (Hold for 3 days)    [] Effient (Hold for 7 days)  [] Xarelto (Hold for 2 days)    [] Eliquis (Hold for 2 days)  [] Brilinta (Hold for 7 days)    [] Coumadin (Hold for 5 days) - (INR needs to be drawn the day prior to procedure- INR < 2.0)    [] Aggrenox (Hold for 7 days)        [] Patient will stop medication on their own.    [] Blood Thinner Form Faxed for approval to hold.    Provider form faxed to:     Assessment Completed by:  Electronically signed by Sanjiv Rivas RN on 1/5/2023 at 4:23 PM

## 2023-01-05 NOTE — PROGRESS NOTES
Pain agreement reviewed with patient and signed per patient. No questions voiced at the present and patient states understanding of agreement. Copy given to patient. Also gave patient a copy of office phone number options.

## 2023-01-05 NOTE — PROGRESS NOTES
[facility] Physical & Pain Medicine    History and Physical    Patient Name: Micah Mcguire    MR #: [id number]    Account [de-identified]    : 1970    Age: 47 y.o. Sex: female    Date: ***    PCP: Busby Rhymes    Referring Provider:    Chief Complaint:   Chief Complaint   Patient presents with    Back Pain     8/10    Leg Pain     10/10, bilateral from knees down       History of Present Illness: The patient is a 47 y.o. female who was referrred with primary complaints of ***. Of Note: This is a work related injury {YES / KR:83415}    HPI    Patient is on or has tried and failed following medications:   Narcotics: {Narcotic list:62951}; Anticonvulsants: {MEDS; ANTICONVULSANTS:03961}; Antidepressants:  {ANTIDEPRESSANTS:027654767}; Muscle Relaxants: {EVISIT MUSCLE RELAXANTS:6232514833},   NSAIDs: {NSAID:94595},   and Topicals: {EVISIT BACK PAIN TOPICAL RX:4146570393}.      Past Pain Management History  {PAIN MANAG PROCEDURES PAIN GYJ:658977027}    Past Imaging  {brain imagin}    Screening Tools:     PEG:    Past PEG: NA    ORT:    PHQ-9:     Current Pain Assessment  Pain Assessment  Pain Assessment: 0-10  Pain Level: 8  Pain Location: Back  Pain Orientation: Lower  Pain Type: Chronic pain  Multiple Pain Sites: Yes    Employment:     Past Medical History  Past Medical History:   Diagnosis Date    Anxiety     Asthma     Chronic back pain     Colon polyp     COPD (chronic obstructive pulmonary disease) (HCC)     Depression     Family history of heart disease 2018    Fibromyalgia     GERD (gastroesophageal reflux disease)     Headache(784.0)     Hiatal hernia     Hypertension     Irritable bowel syndrome     with constipation    Mixed hyperlipidemia 2018    Neuropathy     Osteoarthritis     Pre-diabetes 2018    PTSD (post-traumatic stress disorder)     Shingles        Allergies  Dilaudid [hydromorphone hcl], Pcn [penicillins], Amoxil [amoxicillin], and Morphine    Current Medications  Current Outpatient Medications   Medication Sig Dispense Refill    Multiple Vitamins-Minerals (THERAPEUTIC MULTIVITAMIN-MINERALS) tablet Take 1 tablet by mouth daily      cetirizine (ZYRTEC) 10 MG tablet Take 10 mg by mouth daily      melatonin 3 MG TABS tablet Take 3 mg by mouth nightly as needed      traMADol (ULTRAM) 50 MG tablet Take 50 mg by mouth every 6 hours as needed for Pain.      gabapentin (NEURONTIN) 400 MG capsule Take 400 mg by mouth daily. Naproxen Sodium (ALEVE) 220 MG CAPS Take by mouth as needed for Pain      acetaminophen (TYLENOL) 325 MG tablet Take 650 mg by mouth every 4 hours as needed for Pain      bisacodyl (DULCOLAX) 5 MG EC tablet Take 5 mg by mouth daily as needed for Constipation      topiramate (TOPAMAX) 100 MG tablet Take 4 tablets by mouth daily      escitalopram (LEXAPRO) 20 MG tablet Take 1 tablet by mouth daily      ibuprofen (ADVIL;MOTRIN) 800 MG tablet Take 1 tablet by mouth 3 times daily      lisinopril-hydroCHLOROthiazide (PRINZIDE;ZESTORETIC) 10-12.5 MG per tablet Take 1 tablet by mouth daily      rosuvastatin (CRESTOR) 20 MG tablet Take 1 tablet by mouth daily      busPIRone (BUSPAR) 10 MG tablet Take 1 tablet by mouth 2 times daily as needed      folic acid (FOLVITE) 1 MG tablet Take 1 mg by mouth daily      omeprazole (PRILOSEC) 20 MG delayed release capsule Take 20 mg by mouth 2 times daily      gabapentin (NEURONTIN) 800 MG tablet Take 800 mg by mouth 2 times daily. No current facility-administered medications for this encounter. Social History    Social History     Socioeconomic History    Marital status:      Spouse name: richa    Number of children: 2    Years of education: None    Highest education level: None   Tobacco Use    Smoking status: Every Day     Packs/day: 2.00     Types: Cigarettes    Smokeless tobacco: Never   Vaping Use    Vaping Use: Never used   Substance and Sexual Activity    Alcohol use:  No Drug use: No    Sexual activity: Not Currently     Partners: Male         Family History  family history includes Depression in her mother and sister; Heart Attack in her paternal grandfather and paternal grandmother; Heart Disease in her father; Hypertension in her mother; Stroke in her paternal grandfather and paternal grandmother. Review of Systems:  Review of Systems     14 point ROS negative besides that noted in HPI    Physical exam:     Vitals:    01/05/23 1454   BP: 110/67   Pulse: 76   Resp: 16   Temp: 99.5 °F (37.5 °C)   TempSrc: Temporal   SpO2: 98%   Weight: 214 lb (97.1 kg)   Height: 5' 6\" (1.676 m)       Body mass index is 34.54 kg/m². Physical Exam    Labs:     Lab Results   Component Value Date/Time     01/04/2020 09:02 PM    K 3.8 01/04/2020 09:02 PM     01/04/2020 09:02 PM    CO2 21 01/04/2020 09:02 PM    BUN 7 01/04/2020 09:02 PM    CREATININE 0.9 01/04/2020 09:02 PM    GLUCOSE 103 01/04/2020 09:02 PM    CALCIUM 7.7 01/04/2020 09:02 PM        Lab Results   Component Value Date    WBC 9.7 01/04/2020    HGB 11.0 (L) 01/04/2020    HCT 38.2 01/04/2020    MCV 78.0 (L) 01/04/2020     01/04/2020       Assessment:                                                                                                                                        Active Problems:    Polyneuropathy    Chronic pain syndrome    Pain medication agreement signed  Resolved Problems:    * No resolved hospital problems. *      PLAN:    1. Polyneuropathy    2. Chronic pain syndrome    3. Pain medication agreement signed           Discussion: Discussed exam findings, reviewed imaging {YES***/NO:60}, and gave education regarding pathophysiology, exercise, and treatment options {YES***/NO:60}, and reviewed plan of care with patient. Strongly reinforced that exercise is exteremly important part of pain management. Exercises should be completed upon awaking and before bed.  Patient agreed with POC and questions were asked and answered. See DC instructions. Activity: discussed exercise as beneficial to pain reduction, encouraged stretching exercise at least twice daily with a focus on torso (core) strengthening. Goal:  Pain Management Goals of Therapy:   [] Resolution in pain  [] Decrease in pain level  [] Improvement in ADL's  [] Increase in activities with less pain  [] Decrease in medication     Controlled substance monitoring:    [] Discussed medication side effects, risk of tolerance and/or dependence, and/or alternative treatment  [] Discussed the detrimental effects of long term narcotic use in younger patients especially women of childbearing years. [] No signs and symptoms of potential drug abuse or diversion were identified  [] Signs of potential drug abuse or diversion were identified   [] ORT Score   [] UDS non-compliant   [] See Note  [] Random urine drug screen sent today  [] Random urine drug screen not completed today   [] Deferred New Patient  [] Compliant   [] Not Compliant see note  [] Medication agreement with provider signed today  [] Medication agreement with provider on file under media   [] Medication regimen effective with no c/o side effects and continued   [] New patient continuing current medication while developing POC   [] On going assessment and evaluation of medication regimen  [] Medication regimen not effective see plan for changes  [] Marie Varela reviewed & on file under media     CC: [address], APRN - CNP, 1/5/2023 at 3:22 PM    EMR dragon/transcription disclaimer: Much of this encounter note is electronic transcription/translation of spoken language to printed tach. Electronic translation of spoken language may be erroneous, or at times, nonsensical words or phrases may be inadvertently transcribed.  Although, I have reviewed the note for such errors, some may still exist.

## 2023-01-07 LAB — ANA IGG, ELISA: NORMAL

## 2023-01-09 LAB
ALBUMIN SERPL-MCNC: 3.92 G/DL (ref 3.75–5.01)
ALPHA-1-GLOBULIN: 0.34 G/DL (ref 0.19–0.46)
ALPHA-2-GLOBULIN: 0.64 G/DL (ref 0.48–1.05)
BETA GLOBULIN: 0.64 G/DL (ref 0.48–1.1)
GAMMA GLOBULIN: 0.66 G/DL (ref 0.62–1.51)
PROTEIN ELECTROPHORESIS, SERUM: ABNORMAL
SPE/IFE INTERPRETATION: ABNORMAL
TOTAL PROTEIN: 6.2 G/DL (ref 6.3–8.2)

## 2023-01-11 ENCOUNTER — TELEPHONE (OUTPATIENT)
Dept: PAIN MANAGEMENT | Age: 53
End: 2023-01-11

## 2023-01-11 NOTE — TELEPHONE ENCOUNTER
----- Message from MICHAEL Davalos CNP sent at 1/6/2023  3:02 PM CST -----  B12 is low normal. I would recommend a B12 injections monthly especially since she has had gastric bypass and her numbness and tingling. I can order or her PCP. Her TSH is normal but low normal. I will defer further work up to PCP. Patient may need to start a potassium supplement as it is just 0.1 low but she is on HCTZ and 3 years ago she was low normal. This could be causing some of her leg pain but I will defer to PCP. Finally her renal function does need to be monitored especially since she does take NSAIDs. Her creatinine is slightly elevated and GFR is on the line of being concerning. Since I don't have any labs to compare it to, I recommend recheck in 3 months. She needs to increase her fluid intake and take Ibuprofen on the sparingly side. I will try to route results to [facility] but I don't know if they take outside messages through the system.  Thanks

## 2023-01-11 NOTE — TELEPHONE ENCOUNTER
Call placed to patient to inform her of lab results and recommendation to follow up with PCP. Patient states understanding.

## 2023-01-16 ENCOUNTER — TELEPHONE (OUTPATIENT)
Dept: PAIN MANAGEMENT | Age: 53
End: 2023-01-16

## 2023-01-16 NOTE — TELEPHONE ENCOUNTER
----- Message from MICHAEL Verdugo - CNP sent at 1/12/2023  4:07 PM CST -----  Total protein is slightly low but other values are normal. Labs are consistent to 3 years ago.

## 2023-01-31 ASSESSMENT — ENCOUNTER SYMPTOMS
CONSTIPATION: 0
BACK PAIN: 1

## 2023-02-07 ENCOUNTER — TELEPHONE (OUTPATIENT)
Dept: PAIN MANAGEMENT | Age: 53
End: 2023-02-07

## 2023-02-07 NOTE — TELEPHONE ENCOUNTER
Call placed to patient regarding the results of her NCV. Rachel Ponce has reviewed test results, and I have notified patient of findings. I let her know per Rachel  that she will discuss test results in further detail at her next appointment. Patient verbalized understanding.

## 2023-02-21 ENCOUNTER — HOSPITAL ENCOUNTER (OUTPATIENT)
Dept: PAIN MANAGEMENT | Age: 53
Discharge: HOME | End: 2023-02-21
Payer: COMMERCIAL

## 2023-02-21 VITALS
TEMPERATURE: 98.8 F | OXYGEN SATURATION: 94 % | DIASTOLIC BLOOD PRESSURE: 62 MMHG | HEART RATE: 75 BPM | SYSTOLIC BLOOD PRESSURE: 100 MMHG | RESPIRATION RATE: 16 BRPM | HEIGHT: 66 IN | BODY MASS INDEX: 35.03 KG/M2 | WEIGHT: 218 LBS

## 2023-02-21 DIAGNOSIS — Z51.81 ENCOUNTER FOR MONITORING OPIOID MAINTENANCE THERAPY: ICD-10-CM

## 2023-02-21 DIAGNOSIS — G89.4 CHRONIC PAIN SYNDROME: Primary | ICD-10-CM

## 2023-02-21 DIAGNOSIS — G62.9 POLYNEUROPATHY: ICD-10-CM

## 2023-02-21 DIAGNOSIS — Z79.1 ENCOUNTER FOR LONG-TERM (CURRENT) USE OF NON-STEROIDAL ANTI-INFLAMMATORIES: ICD-10-CM

## 2023-02-21 DIAGNOSIS — G89.4 CHRONIC PAIN SYNDROME: ICD-10-CM

## 2023-02-21 DIAGNOSIS — G43.019 INTRACTABLE MIGRAINE WITHOUT AURA AND WITHOUT STATUS MIGRAINOSUS: ICD-10-CM

## 2023-02-21 DIAGNOSIS — R20.8 ALLODYNIA: ICD-10-CM

## 2023-02-21 DIAGNOSIS — R20.2 BILATERAL LEG PARESTHESIA: ICD-10-CM

## 2023-02-21 DIAGNOSIS — Z79.891 ENCOUNTER FOR MONITORING OPIOID MAINTENANCE THERAPY: ICD-10-CM

## 2023-02-21 LAB
ALBUMIN SERPL-MCNC: 4.1 G/DL (ref 3.5–5.2)
ALP BLD-CCNC: 64 U/L (ref 35–104)
ALT SERPL-CCNC: 18 U/L (ref 5–33)
ANION GAP SERPL CALCULATED.3IONS-SCNC: 11 MMOL/L (ref 7–19)
AST SERPL-CCNC: 23 U/L (ref 5–32)
BILIRUB SERPL-MCNC: 0.3 MG/DL (ref 0.2–1.2)
BUN BLDV-MCNC: 9 MG/DL (ref 6–20)
CALCIUM SERPL-MCNC: 8.7 MG/DL (ref 8.6–10)
CHLORIDE BLD-SCNC: 93 MMOL/L (ref 98–111)
CO2: 27 MMOL/L (ref 22–29)
CREAT SERPL-MCNC: 1.1 MG/DL (ref 0.5–0.9)
GFR SERPL CREATININE-BSD FRML MDRD: 60 ML/MIN/{1.73_M2}
GLUCOSE BLD-MCNC: 91 MG/DL (ref 74–109)
POTASSIUM SERPL-SCNC: 3.7 MMOL/L (ref 3.5–5)
SODIUM BLD-SCNC: 131 MMOL/L (ref 136–145)
TOTAL PROTEIN: 6.5 G/DL (ref 6.6–8.7)

## 2023-02-21 PROCEDURE — 99214 OFFICE O/P EST MOD 30 MIN: CPT

## 2023-02-21 PROCEDURE — 80053 COMPREHEN METABOLIC PANEL: CPT

## 2023-02-21 PROCEDURE — 36415 COLL VENOUS BLD VENIPUNCTURE: CPT

## 2023-02-21 RX ORDER — QUETIAPINE FUMARATE 400 MG/1
1 TABLET, FILM COATED ORAL DAILY
COMMUNITY
Start: 2023-02-06

## 2023-02-21 RX ORDER — GABAPENTIN 400 MG/1
400 CAPSULE ORAL DAILY
Qty: 30 CAPSULE | Refills: 2 | Status: SHIPPED | OUTPATIENT
Start: 2023-02-28 | End: 2023-05-29

## 2023-02-21 RX ORDER — ATOGEPANT 60 MG/1
1 TABLET ORAL DAILY
Qty: 30 TABLET | Refills: 2 | Status: SHIPPED | OUTPATIENT
Start: 2023-02-21

## 2023-02-21 RX ORDER — DULOXETIN HYDROCHLORIDE 30 MG/1
30 CAPSULE, DELAYED RELEASE ORAL NIGHTLY
Qty: 30 CAPSULE | Refills: 2 | Status: SHIPPED | OUTPATIENT
Start: 2023-02-21

## 2023-02-21 RX ORDER — IBUPROFEN 800 MG/1
800 TABLET ORAL EVERY 8 HOURS PRN
Qty: 90 TABLET | Refills: 2 | Status: SHIPPED | OUTPATIENT
Start: 2023-02-21

## 2023-02-21 RX ORDER — GABAPENTIN 800 MG/1
800 TABLET ORAL 2 TIMES DAILY
Qty: 60 TABLET | Refills: 2 | Status: SHIPPED | OUTPATIENT
Start: 2023-02-28 | End: 2023-05-29

## 2023-02-21 RX ORDER — TRAMADOL HYDROCHLORIDE 50 MG/1
50 TABLET ORAL EVERY 6 HOURS PRN
Qty: 120 TABLET | Refills: 2 | Status: SHIPPED | OUTPATIENT
Start: 2023-03-16 | End: 2023-06-14

## 2023-02-21 ASSESSMENT — PAIN DESCRIPTION - ORIENTATION
ORIENTATION_2: RIGHT;LEFT
ORIENTATION: LOWER

## 2023-02-21 ASSESSMENT — PAIN DESCRIPTION - INTENSITY: RATING_2: 10

## 2023-02-21 ASSESSMENT — ENCOUNTER SYMPTOMS
BACK PAIN: 1
CONSTIPATION: 0

## 2023-02-21 ASSESSMENT — PAIN SCALES - GENERAL: PAINLEVEL_OUTOF10: 8

## 2023-02-21 ASSESSMENT — PAIN DESCRIPTION - LOCATION
LOCATION_2: LEG
LOCATION: BACK

## 2023-02-21 ASSESSMENT — PAIN DESCRIPTION - PAIN TYPE: TYPE: CHRONIC PAIN

## 2023-02-21 NOTE — TELEPHONE ENCOUNTER
1. Chronic pain syndrome  - traMADol (ULTRAM) 50 MG tablet; Take 1 tablet by mouth every 6 hours as needed for Pain for up to 90 days. 30 day supply per fill. Max Daily Amount: 200 mg  Dispense: 120 tablet; Refill: 2  - ibuprofen (ADVIL;MOTRIN) 800 MG tablet; Take 1 tablet by mouth every 8 hours as needed for Pain  Dispense: 90 tablet; Refill: 2  - gabapentin (NEURONTIN) 400 MG capsule; Take 1 capsule by mouth daily for 90 days. Max Daily Amount: 400 mg  Dispense: 30 capsule; Refill: 2    2. Polyneuropathy  - traMADol (ULTRAM) 50 MG tablet; Take 1 tablet by mouth every 6 hours as needed for Pain for up to 90 days. 30 day supply per fill. Max Daily Amount: 200 mg  Dispense: 120 tablet; Refill: 2  - gabapentin (NEURONTIN) 800 MG tablet; Take 1 tablet by mouth 2 times daily for 90 days. Max Daily Amount: 1,600 mg  Dispense: 60 tablet; Refill: 2  - gabapentin (NEURONTIN) 400 MG capsule; Take 1 capsule by mouth daily for 90 days. Max Daily Amount: 400 mg  Dispense: 30 capsule; Refill: 2    3. Intractable migraine without aura and without status migrainosus    4. Bilateral leg paresthesia  - gabapentin (NEURONTIN) 800 MG tablet; Take 1 tablet by mouth 2 times daily for 90 days. Max Daily Amount: 1,600 mg  Dispense: 60 tablet; Refill: 2    5. Allodynia  - gabapentin (NEURONTIN) 800 MG tablet; Take 1 tablet by mouth 2 times daily for 90 days. Max Daily Amount: 1,600 mg  Dispense: 60 tablet;  Refill: 2   Refills at office appointment

## 2023-02-21 NOTE — DISCHARGE INSTRUCTIONS
Wean Topamax by 25 mg's per week until completely weaned. Start with 50 mg bedtime dose. Take 25 mg Three times a day for 1 week  Then take 25 mg twice a day for 1 week  Then take 25 mg daily for 1 week. Watch for withdrawal symptoms     Start Qulipta 60 mg daily today    Start Cymbalta 30 mg at hs today    Topiramate is associated with a range of effects involving the central nervous system (CNS) in all ages. Most common are dose-related sedative effects (eg, dizziness, drowsiness, fatigue). Additionally, topiramate has been associated with both short-term and long-term cognitive dysfunction in both children and adults, even at low doses (?100 mg/day), including disturbance in attention, memory impairment, and language problems. Topiramate-associated cognitive dysfunction includes declines in verbal fluency, attention/concentration, processing speed, language skills, perception, working memory, reduced IQ, poor verbal fluency, abnormal thinking, and word-finding deficits (Ref). Topiramate is also associated with psychiatric disturbances (eg, aggressive behavior, mood disorder, anxiety, depression, exacerbation of depression), particularly in patients with previous history of depression or cognitive adverse reactions. Topiramate is also associated with paresthesia.  The numbness and tingling of topiramate paresthesia are generally self-limiting, resolving over 2 to 3 months of therapy

## 2023-02-21 NOTE — PROGRESS NOTES
Clinic Documentation      Education Provided:  [x] Review of Sukhjinder Jesus  [] Agreement Review  [x] PEG Score Calculated [] PHQ Score Calculated [] ORT Score Calculated    [] Compliance Issues Discussed [] Cognitive Behavior Needs [x] Exercise [] Review of Test [] Financial Issues  [x] Tobacco/Alcohol Use Reviewed [x] Teaching [] New Patient [] Picture Obtained    Physician Plan:  [] Outgoing Referral  [] Pharmacy Consult  [x] Test Ordered [x] Prescription Ordered/Changed   [] Obtained Test Results / Consult Notes        Complete if patient is withholding blood thinner for procedure     Blood Thinner Patient is currently taking:      [] Plavix (Hold for 7 days)  [] Aspirin (Hold for 5 days)     [] Pletal (Hold for 2 days)  [] Pradaxa (Hold for 3 days)    [] Effient (Hold for 7 days)  [] Xarelto (Hold for 2 days)    [] Eliquis (Hold for 2 days)  [] Brilinta (Hold for 7 days)    [] Coumadin (Hold for 5 days) - (INR needs to be drawn the day prior to procedure- INR < 2.0)    [] Aggrenox (Hold for 7 days)        [] Patient will stop medication on their own.    [] Blood Thinner Form Faxed for approval to hold.    Provider form faxed to:     Assessment Completed by:  Electronically signed by Rosemary Brooks RN on 2/21/2023 at 1:26 PM

## 2023-02-27 ENCOUNTER — TELEPHONE (OUTPATIENT)
Dept: PAIN MANAGEMENT | Age: 53
End: 2023-02-27

## 2023-02-27 NOTE — TELEPHONE ENCOUNTER
----- Message from MICHAEL Kaur CNP sent at 2/24/2023  9:44 AM CST -----  Patient's sodium is a little low which could be medication induced between the anti-convulsants and diuretic. She's weaning off Topamax. Her renal function is the same. She needs to decrease the amount of ibuprofen she's taking. She needs to follow up with her PCP for recheck of labs in the next week. She needs to limit free water until she see's her PCP.

## 2023-02-27 NOTE — TELEPHONE ENCOUNTER
Call placed to patient to inform her of CMP results and recommendations. Patient states understanding.

## 2023-03-26 PROBLEM — Z79.891 ENCOUNTER FOR MONITORING OPIOID MAINTENANCE THERAPY: Status: ACTIVE | Noted: 2023-03-26

## 2023-03-26 PROBLEM — Z51.81 ENCOUNTER FOR MONITORING OPIOID MAINTENANCE THERAPY: Status: ACTIVE | Noted: 2023-03-26

## 2023-06-06 DIAGNOSIS — G89.4 CHRONIC PAIN SYNDROME: ICD-10-CM

## 2023-06-06 DIAGNOSIS — G62.9 POLYNEUROPATHY: ICD-10-CM

## 2023-06-06 RX ORDER — TRAMADOL HYDROCHLORIDE 50 MG/1
50 TABLET ORAL EVERY 6 HOURS PRN
Qty: 120 TABLET | Refills: 2 | Status: SHIPPED | OUTPATIENT
Start: 2023-06-12 | End: 2023-09-10

## 2023-06-06 NOTE — TELEPHONE ENCOUNTER
Pharmacy filled last 2 fills from previous doctor. Patient was unaware and is not seeing any other doctor for medication.

## 2023-06-28 DIAGNOSIS — G62.9 POLYNEUROPATHY: ICD-10-CM

## 2023-06-28 DIAGNOSIS — R20.8 ALLODYNIA: ICD-10-CM

## 2023-06-28 DIAGNOSIS — G89.4 CHRONIC PAIN SYNDROME: ICD-10-CM

## 2023-06-28 DIAGNOSIS — R20.2 BILATERAL LEG PARESTHESIA: ICD-10-CM

## 2023-06-28 DIAGNOSIS — G43.019 INTRACTABLE MIGRAINE WITHOUT AURA AND WITHOUT STATUS MIGRAINOSUS: ICD-10-CM

## 2023-06-29 RX ORDER — TRAMADOL HYDROCHLORIDE 50 MG/1
50 TABLET ORAL EVERY 6 HOURS PRN
Qty: 120 TABLET | Refills: 2 | Status: SHIPPED | OUTPATIENT
Start: 2023-07-12 | End: 2023-10-10

## 2023-06-29 RX ORDER — GABAPENTIN 400 MG/1
400 CAPSULE ORAL DAILY
Qty: 30 CAPSULE | Refills: 2 | Status: SHIPPED | OUTPATIENT
Start: 2023-07-26 | End: 2023-10-24

## 2023-06-29 RX ORDER — DULOXETIN HYDROCHLORIDE 30 MG/1
30 CAPSULE, DELAYED RELEASE ORAL NIGHTLY
Qty: 30 CAPSULE | Refills: 2 | Status: SHIPPED | OUTPATIENT
Start: 2023-06-29

## 2023-06-29 RX ORDER — GABAPENTIN 800 MG/1
800 TABLET ORAL 2 TIMES DAILY
Qty: 60 TABLET | Refills: 2 | Status: SHIPPED | OUTPATIENT
Start: 2023-07-27 | End: 2023-10-25

## 2023-07-27 ENCOUNTER — HOSPITAL ENCOUNTER (OUTPATIENT)
Dept: PAIN MANAGEMENT | Age: 53
Discharge: HOME | End: 2023-07-27
Payer: COMMERCIAL

## 2023-07-27 VITALS
SYSTOLIC BLOOD PRESSURE: 110 MMHG | DIASTOLIC BLOOD PRESSURE: 63 MMHG | WEIGHT: 244 LBS | TEMPERATURE: 97.4 F | OXYGEN SATURATION: 94 % | RESPIRATION RATE: 16 BRPM | HEIGHT: 66 IN | HEART RATE: 82 BPM | BODY MASS INDEX: 39.21 KG/M2

## 2023-07-27 DIAGNOSIS — G43.019 INTRACTABLE MIGRAINE WITHOUT AURA AND WITHOUT STATUS MIGRAINOSUS: ICD-10-CM

## 2023-07-27 DIAGNOSIS — G89.4 CHRONIC PAIN SYNDROME: ICD-10-CM

## 2023-07-27 DIAGNOSIS — G62.9 POLYNEUROPATHY: ICD-10-CM

## 2023-07-27 PROCEDURE — 99213 OFFICE O/P EST LOW 20 MIN: CPT

## 2023-07-27 RX ORDER — DULOXETIN HYDROCHLORIDE 60 MG/1
60 CAPSULE, DELAYED RELEASE ORAL NIGHTLY
Qty: 30 CAPSULE | Refills: 2 | Status: SHIPPED | OUTPATIENT
Start: 2023-07-27 | End: 2023-10-25

## 2023-07-27 RX ORDER — FLUTICASONE PROPIONATE 50 MCG
SPRAY, SUSPENSION (ML) NASAL
COMMUNITY
Start: 2023-07-05

## 2023-07-27 RX ORDER — OMEPRAZOLE 40 MG/1
CAPSULE, DELAYED RELEASE ORAL
COMMUNITY
Start: 2023-06-09

## 2023-07-27 RX ORDER — NICOTINE 21 MG/24HR
PATCH, TRANSDERMAL 24 HOURS TRANSDERMAL
COMMUNITY
Start: 2023-07-05

## 2023-07-27 ASSESSMENT — ENCOUNTER SYMPTOMS: CONSTIPATION: 0

## 2023-07-27 ASSESSMENT — PAIN DESCRIPTION - INTENSITY: RATING_2: 10

## 2023-07-27 ASSESSMENT — PAIN DESCRIPTION - ORIENTATION
ORIENTATION: LOWER;UPPER
ORIENTATION_2: RIGHT;LEFT

## 2023-07-27 ASSESSMENT — PAIN SCALES - GENERAL: PAINLEVEL_OUTOF10: 8

## 2023-07-27 ASSESSMENT — PAIN DESCRIPTION - PAIN TYPE: TYPE: CHRONIC PAIN

## 2023-07-27 ASSESSMENT — PAIN DESCRIPTION - LOCATION
LOCATION_2: LEG
LOCATION: BACK

## 2023-07-27 NOTE — PROGRESS NOTES
Clinic Documentation      Education Provided:  [x] Review of Olivia Pemberton  [] Agreement Review  [x] PEG Score Calculated [] PHQ Score Calculated [] ORT Score Calculated    [] Compliance Issues Discussed [] Cognitive Behavior Needs [x] Exercise [] Review of Test [] Financial Issues  [x] Tobacco/Alcohol Use Reviewed [x] Teaching [] New Patient [] Picture Obtained    Physician Plan:  [] Outgoing Referral  [] Pharmacy Consult  [] Test Ordered [x] Prescription Ordered/Changed   [] Obtained Test Results / Consult Notes        Complete if patient is withholding blood thinner for procedure     Blood Thinner Patient is currently taking:      [] Plavix (Hold for 7 days)  [] Aspirin (Hold for 5 days)     [] Pletal (Hold for 2 days)  [] Pradaxa (Hold for 3 days)    [] Effient (Hold for 7 days)  [] Xarelto (Hold for 2 days)    [] Eliquis (Hold for 2 days)  [] Brilinta (Hold for 7 days)    [] Coumadin (Hold for 5 days) - (INR needs to be drawn the day prior to procedure- INR < 2.0)    [] Aggrenox (Hold for 7 days)        [] Patient will stop medication on their own.    [] Blood Thinner Form Faxed for approval to hold.    Provider form faxed to:     Assessment Completed by:  Electronically signed by Dirk Vazquez RN on 7/27/2023 at 4:07 PM
if she sits or drives for prolonged period that the pain worsens from her knees down. States that her legs are very hypersensitive to touch. Patient has not had any work-up source of the pain. Patient has been on gabapentin, Topamax, Zoloft, trazodone, Remeron, and Ultram.    While discussing patient's lower extremities she did mention that her hands go numb at times. That she will drop things from time to time and that her fingers lock up on occasion. Of Note: This is a work related injury No    Past Medical History  Past Medical History:   Diagnosis Date    Anxiety     Asthma     Chronic back pain     Colon polyp     COPD (chronic obstructive pulmonary disease) ([address])     Depression     Family history of heart disease 03/12/2018    Fibromyalgia     GERD (gastroesophageal reflux disease)     Headache(784.0)     Hiatal hernia     Hypertension     Irritable bowel syndrome     with constipation    Mixed hyperlipidemia 03/12/2018    Neuropathy     Osteoarthritis     Pre-diabetes 03/12/2018    PTSD (post-traumatic stress disorder)     Shingles        Allergies  Dilaudid [hydromorphone hcl], Molds & smuts, Pcn [penicillins], Amoxil [amoxicillin], and Morphine    Current Medications  Current Outpatient Medications   Medication Sig Dispense Refill    DULoxetine (CYMBALTA) 30 MG extended release capsule Take 1 capsule by mouth nightly 30 capsule 2    gabapentin (NEURONTIN) 400 MG capsule Take 1 capsule by mouth daily for 90 days. May fill 07/26/23 Max Daily Amount: 400 mg 30 capsule 2    gabapentin (NEURONTIN) 800 MG tablet Take 1 tablet by mouth 2 times daily for 90 days. May fill 07/27/23 Max Daily Amount: 1,600 mg 60 tablet 2    traMADol (ULTRAM) 50 MG tablet Take 1 tablet by mouth every 6 hours as needed for Pain for up to 90 days. May fill 07/12/23.  30 day supply per fill.  Max Daily Amount: 200 mg 120 tablet 2    fluticasone (FLONASE) 50 MCG/ACT nasal spray       nicotine (NICODERM CQ) 21 MG/24HR

## 2023-08-01 RX ORDER — ATOGEPANT 60 MG/1
60 TABLET ORAL DAILY
Qty: 30 TABLET | Refills: 2 | Status: SHIPPED | OUTPATIENT
Start: 2023-08-01 | End: 2023-08-31

## 2023-08-01 RX ORDER — UBROGEPANT 100 MG/1
100 TABLET ORAL DAILY PRN
Qty: 16 TABLET | Refills: 2 | Status: SHIPPED | OUTPATIENT
Start: 2023-08-01 | End: 2023-08-31

## 2023-08-03 ENCOUNTER — TELEPHONE (OUTPATIENT)
Dept: PAIN MANAGEMENT | Age: 53
End: 2023-08-03

## 2023-08-27 ASSESSMENT — ENCOUNTER SYMPTOMS: BACK PAIN: 1

## 2023-09-07 ENCOUNTER — TELEPHONE (OUTPATIENT)
Dept: PAIN MANAGEMENT | Age: 53
End: 2023-09-07

## 2023-09-07 NOTE — TELEPHONE ENCOUNTER
PA approved for [facility] 09/05/23 - 09/05/24  PA denied for [employer], documention from the insurance is in your mailbox.

## 2023-09-11 ENCOUNTER — TELEPHONE (OUTPATIENT)
Dept: PAIN MANAGEMENT | Age: 53
End: 2023-09-11

## 2023-10-05 DIAGNOSIS — G89.4 CHRONIC PAIN SYNDROME: ICD-10-CM

## 2023-10-05 DIAGNOSIS — G62.9 POLYNEUROPATHY: ICD-10-CM

## 2023-10-05 RX ORDER — TRAMADOL HYDROCHLORIDE 50 MG/1
50 TABLET ORAL EVERY 6 HOURS PRN
Qty: 120 TABLET | Refills: 2 | Status: SHIPPED | OUTPATIENT
Start: 2023-10-11 | End: 2024-01-09

## 2023-10-16 ENCOUNTER — HOSPITAL ENCOUNTER (OUTPATIENT)
Dept: PAIN MANAGEMENT | Age: 53
Discharge: HOME | End: 2023-10-16
Payer: COMMERCIAL

## 2023-10-16 VITALS
HEIGHT: 66 IN | DIASTOLIC BLOOD PRESSURE: 78 MMHG | OXYGEN SATURATION: 94 % | RESPIRATION RATE: 18 BRPM | TEMPERATURE: 98 F | BODY MASS INDEX: 38.41 KG/M2 | HEART RATE: 80 BPM | SYSTOLIC BLOOD PRESSURE: 128 MMHG | WEIGHT: 239 LBS

## 2023-10-16 DIAGNOSIS — R20.8 ALLODYNIA: ICD-10-CM

## 2023-10-16 DIAGNOSIS — M53.3 SI (SACROILIAC) JOINT DYSFUNCTION: Primary | ICD-10-CM

## 2023-10-16 DIAGNOSIS — G89.4 CHRONIC PAIN SYNDROME: ICD-10-CM

## 2023-10-16 DIAGNOSIS — G89.29 CHRONIC BILATERAL LOW BACK PAIN WITHOUT SCIATICA: ICD-10-CM

## 2023-10-16 DIAGNOSIS — G62.9 POLYNEUROPATHY: ICD-10-CM

## 2023-10-16 DIAGNOSIS — M25.552 BILATERAL HIP PAIN: ICD-10-CM

## 2023-10-16 DIAGNOSIS — M25.551 BILATERAL HIP PAIN: ICD-10-CM

## 2023-10-16 DIAGNOSIS — R20.2 BILATERAL LEG PARESTHESIA: ICD-10-CM

## 2023-10-16 DIAGNOSIS — G43.019 INTRACTABLE MIGRAINE WITHOUT AURA AND WITHOUT STATUS MIGRAINOSUS: ICD-10-CM

## 2023-10-16 DIAGNOSIS — M54.50 CHRONIC BILATERAL LOW BACK PAIN WITHOUT SCIATICA: ICD-10-CM

## 2023-10-16 PROBLEM — E78.2 MIXED HYPERLIPIDEMIA: Status: RESOLVED | Noted: 2018-03-12 | Resolved: 2023-10-16

## 2023-10-16 PROBLEM — R73.03 PRE-DIABETES: Status: RESOLVED | Noted: 2018-03-12 | Resolved: 2023-10-16

## 2023-10-16 PROBLEM — Z82.49 FAMILY HISTORY OF HEART DISEASE: Status: RESOLVED | Noted: 2018-03-12 | Resolved: 2023-10-16

## 2023-10-16 PROCEDURE — 99214 OFFICE O/P EST MOD 30 MIN: CPT

## 2023-10-16 PROCEDURE — 99214 OFFICE O/P EST MOD 30 MIN: CPT | Performed by: NURSE PRACTITIONER

## 2023-10-16 RX ORDER — DULOXETIN HYDROCHLORIDE 60 MG/1
60 CAPSULE, DELAYED RELEASE ORAL NIGHTLY
Qty: 30 CAPSULE | Refills: 5 | Status: SHIPPED | OUTPATIENT
Start: 2023-10-16 | End: 2024-04-13

## 2023-10-16 RX ORDER — UBROGEPANT 100 MG/1
TABLET ORAL
COMMUNITY
Start: 2023-10-01 | End: 2023-10-16 | Stop reason: SDUPTHER

## 2023-10-16 RX ORDER — ATOGEPANT 60 MG/1
1 TABLET ORAL DAILY
COMMUNITY
Start: 2023-10-01 | End: 2023-10-16 | Stop reason: SDUPTHER

## 2023-10-16 RX ORDER — TRAMADOL HYDROCHLORIDE 200 MG/1
200 TABLET, EXTENDED RELEASE ORAL DAILY
Qty: 30 TABLET | Refills: 0 | Status: SHIPPED | OUTPATIENT
Start: 2023-10-16 | End: 2023-10-19

## 2023-10-16 RX ORDER — TRAMADOL HYDROCHLORIDE 50 MG/1
50 TABLET ORAL EVERY 6 HOURS PRN
Qty: 120 TABLET | Refills: 2 | Status: SHIPPED | OUTPATIENT
Start: 2023-10-16 | End: 2023-10-19

## 2023-10-16 RX ORDER — ATOGEPANT 60 MG/1
1 TABLET ORAL DAILY
Qty: 30 TABLET | Refills: 5 | Status: SHIPPED | OUTPATIENT
Start: 2023-10-16 | End: 2024-04-13

## 2023-10-16 RX ORDER — GABAPENTIN 400 MG/1
400 CAPSULE ORAL DAILY
Qty: 30 CAPSULE | Refills: 2 | Status: SHIPPED | OUTPATIENT
Start: 2023-10-26 | End: 2023-10-19

## 2023-10-16 RX ORDER — GABAPENTIN 800 MG/1
800 TABLET ORAL 2 TIMES DAILY
Qty: 60 TABLET | Refills: 2 | Status: SHIPPED | OUTPATIENT
Start: 2023-10-26 | End: 2023-10-19

## 2023-10-16 RX ORDER — UBROGEPANT 100 MG/1
TABLET ORAL
Qty: 16 TABLET | Refills: 5 | Status: SHIPPED | OUTPATIENT
Start: 2023-10-16

## 2023-10-16 ASSESSMENT — PAIN DESCRIPTION - LOCATION
LOCATION: BACK
LOCATION_2: LEG

## 2023-10-16 ASSESSMENT — PAIN DESCRIPTION - INTENSITY: RATING_2: 10

## 2023-10-16 ASSESSMENT — PAIN SCALES - GENERAL: PAINLEVEL_OUTOF10: 10

## 2023-10-16 ASSESSMENT — PAIN DESCRIPTION - PAIN TYPE: TYPE: CHRONIC PAIN

## 2023-10-16 ASSESSMENT — ENCOUNTER SYMPTOMS
CONSTIPATION: 0
BACK PAIN: 1

## 2023-10-16 ASSESSMENT — PAIN DESCRIPTION - ORIENTATION
ORIENTATION: LOWER
ORIENTATION_2: RIGHT;LEFT

## 2023-10-16 NOTE — PROGRESS NOTES
Clinic Documentation      Education Provided:  [x] Review of Kendrick Palencia  [] Agreement Review  [x] PEG Score Calculated [] PHQ Score Calculated [] ORT Score Calculated    [] Compliance Issues Discussed [] Cognitive Behavior Needs [x] Exercise [] Review of Test [] Financial Issues  [x] Tobacco/Alcohol Use Reviewed [x] Teaching [] New Patient [] Picture Obtained    Physician Plan:  [] Outgoing Referral  [] Pharmacy Consult  [x] Test Ordered [x] Prescription Ordered/Changed   [] Obtained Test Results / Consult Notes        Complete if patient is withholding blood thinner for procedure     Blood Thinner Patient is currently taking:      [] Plavix (Hold for 7 days)  [] Aspirin (Hold for 5 days)     [] Pletal (Hold for 2 days)  [] Pradaxa (Hold for 3 days)    [] Effient (Hold for 7 days)  [] Xarelto (Hold for 2 days)    [] Eliquis (Hold for 2 days)  [] Brilinta (Hold for 7 days)    [] Coumadin (Hold for 5 days) - (INR needs to be drawn the day prior to procedure- INR < 2.0)    [] Aggrenox (Hold for 7 days)        [] Patient will stop medication on their own.    [] Blood Thinner Form Faxed for approval to hold.    Provider form faxed to:     Assessment Completed by:  Electronically signed by Mode Mustafa MA on 10/16/2023 at [id number] Documentation      Education Provided:  [x] Review of Sam  [] Agreement Review  [x] PEG Score Calculated [] PHQ Score Calculated [] ORT Score Calculated    [] Compliance Issues Discussed [] Cognitive Behavior Needs [x] Exercise [] Review of Test [] Financial Issues  [x] Tobacco/Alcohol Use Reviewed [x] Teaching [] New Patient [] Picture Obtained    Physician Plan:  [] Outgoing Referral  [] Pharmacy Consult  [x] Test Ordered [x] Prescription Ordered/Changed   [] Obtained Test Results / Consult Notes        Complete if patient is withholding blood thinner for procedure     Blood Thinner Patient is currently taking:      [] Plavix (Hold for 7 days)  [] Aspirin (Hold for 5 days)

## 2023-10-16 NOTE — TELEPHONE ENCOUNTER
1. Chronic pain syndrome  - DULoxetine (CYMBALTA) 60 MG extended release capsule; Take 1 capsule by mouth nightly  Dispense: 30 capsule; Refill: 5  - gabapentin (NEURONTIN) 400 MG capsule; Take 1 capsule by mouth daily for 90 days. Max Daily Amount: 400 mg  Dispense: 30 capsule; Refill: 2    2. Polyneuropathy  - DULoxetine (CYMBALTA) 60 MG extended release capsule; Take 1 capsule by mouth nightly  Dispense: 30 capsule; Refill: 5  - gabapentin (NEURONTIN) 800 MG tablet; Take 1 tablet by mouth 2 times daily for 90 days. Max Daily Amount: 1,600 mg  Dispense: 60 tablet; Refill: 2  - gabapentin (NEURONTIN) 400 MG capsule; Take 1 capsule by mouth daily for 90 days. Max Daily Amount: 400 mg  Dispense: 30 capsule; Refill: 2    3. Intractable migraine without aura and without status migrainosus  - UBRELVY 100 MG TABS; TAKE 1 TABLET BY MOUTH EVERY DAY AS NEEDED. MAY REPEAT IN 2 HOURS  Dispense: 16 tablet; Refill: 5  - QULIPTA 60 MG TABS; Take 1 tablet by mouth daily  Dispense: 30 tablet; Refill: 5  - DULoxetine (CYMBALTA) 60 MG extended release capsule; Take 1 capsule by mouth nightly  Dispense: 30 capsule; Refill: 5    4. Bilateral leg paresthesia  - gabapentin (NEURONTIN) 800 MG tablet; Take 1 tablet by mouth 2 times daily for 90 days. Max Daily Amount: 1,600 mg  Dispense: 60 tablet; Refill: 2  - gabapentin (NEURONTIN) 400 MG capsule; Take 1 capsule by mouth daily for 90 days. Max Daily Amount: 400 mg  Dispense: 30 capsule; Refill: 2    5. Allodynia  - gabapentin (NEURONTIN) 800 MG tablet; Take 1 tablet by mouth 2 times daily for 90 days. Max Daily Amount: 1,600 mg  Dispense: 60 tablet; Refill: 2  - gabapentin (NEURONTIN) 400 MG capsule; Take 1 capsule by mouth daily for 90 days. Max Daily Amount: 400 mg  Dispense: 30 capsule; Refill: 2      Requested Prescriptions     Signed Prescriptions Disp Refills    UBRELVY 100 MG TABS 16 tablet 5     Sig: TAKE 1 TABLET BY MOUTH EVERY DAY AS NEEDED.  MAY REPEAT IN 2 HOURS

## 2023-10-16 NOTE — PROGRESS NOTES
[facility] Physical & Pain Medicine    Office Visit    Patient Name: Nina Azar    MR #: [id number]    Account [de-identified]    : 1970    Age: 46 y.o. Sex: female    Date: 10/16/2023    PCP: Aleksandr Nance    Chief Complaint:   Chief Complaint   Patient presents with    Back Pain     10/10    Leg Pain     Bilateral 10/10       History of Present Illness: The patient is a 46 y.o. female who presents for office visits. Patient states that her neuropathy and her migraines are under control however she states that her back and her hips are out of control. She states that it takes forever for her to do anything as she has to take rest periods. She states that she has sleep in a recliner or inclined position. She is unable to take stairs. She states that her pain is deep into her hips/groin area. She states that she has more pain getting up from a seated position that sitting down. Patient points to her bilateral SI joints as the source of her pain. Leg Pain  This is a chronic problem. The current episode started more than 1 year ago. The problem occurs constantly. The problem has been waxing and waning. Associated symptoms include arthralgias, headaches, myalgias and numbness. Pertinent negatives include no neck pain or weakness. Exacerbated by: prolonged sitting or driving. Back Pain  This is a recurrent problem. The current episode started more than 1 month ago. The problem occurs constantly. The problem has been gradually worsening since onset. The pain is present in the lumbar spine and sacro-iliac. The quality of the pain is described as aching, burning and cramping. The pain is at a severity of 10/10. The symptoms are aggravated by lying down, sitting and standing (walking, stairs). Associated symptoms include headaches, leg pain and numbness. Pertinent negatives include no weakness.          Past Pain Management History  No previous PM    Past Imaging  No recent work

## 2023-10-17 DIAGNOSIS — G89.29 CHRONIC BILATERAL LOW BACK PAIN WITHOUT SCIATICA: ICD-10-CM

## 2023-10-17 DIAGNOSIS — G89.4 CHRONIC PAIN SYNDROME: ICD-10-CM

## 2023-10-17 DIAGNOSIS — G62.9 POLYNEUROPATHY: ICD-10-CM

## 2023-10-17 DIAGNOSIS — R20.2 BILATERAL LEG PARESTHESIA: ICD-10-CM

## 2023-10-17 DIAGNOSIS — M54.50 CHRONIC BILATERAL LOW BACK PAIN WITHOUT SCIATICA: ICD-10-CM

## 2023-10-17 DIAGNOSIS — R20.8 ALLODYNIA: ICD-10-CM

## 2023-10-17 NOTE — TELEPHONE ENCOUNTER
Scripts sent at appointment needed to be sent with supervising provider on script per [place] law. Routed to be sent again.

## 2023-10-19 RX ORDER — IBUPROFEN 800 MG/1
800 TABLET ORAL EVERY 8 HOURS PRN
Qty: 90 TABLET | Refills: 2 | Status: SHIPPED | OUTPATIENT
Start: 2023-10-19

## 2023-10-19 RX ORDER — TRAMADOL HYDROCHLORIDE 200 MG/1
200 TABLET, EXTENDED RELEASE ORAL DAILY
Qty: 30 TABLET | Refills: 0 | Status: SHIPPED | OUTPATIENT
Start: 2023-10-19 | End: 2023-11-18

## 2023-10-19 RX ORDER — TRAMADOL HYDROCHLORIDE 50 MG/1
50 TABLET ORAL EVERY 6 HOURS PRN
Qty: 120 TABLET | Refills: 2 | Status: SHIPPED | OUTPATIENT
Start: 2023-10-19 | End: 2024-01-17

## 2023-10-19 RX ORDER — GABAPENTIN 800 MG/1
800 TABLET ORAL 2 TIMES DAILY
Qty: 60 TABLET | Refills: 2 | Status: SHIPPED | OUTPATIENT
Start: 2023-10-26 | End: 2024-01-24

## 2023-10-19 RX ORDER — GABAPENTIN 400 MG/1
400 CAPSULE ORAL DAILY
Qty: 30 CAPSULE | Refills: 2 | Status: SHIPPED | OUTPATIENT
Start: 2023-10-26 | End: 2024-01-24

## 2023-10-25 DIAGNOSIS — G89.4 CHRONIC PAIN SYNDROME: ICD-10-CM

## 2023-10-25 DIAGNOSIS — R20.2 BILATERAL LEG PARESTHESIA: ICD-10-CM

## 2023-10-25 DIAGNOSIS — R20.8 ALLODYNIA: ICD-10-CM

## 2023-10-25 DIAGNOSIS — G62.9 POLYNEUROPATHY: ICD-10-CM

## 2023-11-03 ENCOUNTER — HOSPITAL ENCOUNTER (OUTPATIENT)
Dept: GENERAL RADIOLOGY | Age: 53
Discharge: HOME | End: 2023-11-03
Payer: COMMERCIAL

## 2023-11-03 DIAGNOSIS — M53.3 SI (SACROILIAC) JOINT DYSFUNCTION: ICD-10-CM

## 2023-11-03 DIAGNOSIS — M54.50 CHRONIC BILATERAL LOW BACK PAIN WITHOUT SCIATICA: ICD-10-CM

## 2023-11-03 DIAGNOSIS — M25.552 BILATERAL HIP PAIN: ICD-10-CM

## 2023-11-03 DIAGNOSIS — G89.29 CHRONIC BILATERAL LOW BACK PAIN WITHOUT SCIATICA: ICD-10-CM

## 2023-11-03 DIAGNOSIS — M25.551 BILATERAL HIP PAIN: ICD-10-CM

## 2023-11-03 PROCEDURE — 73521 X-RAY EXAM HIPS BI 2 VIEWS: CPT

## 2023-11-03 PROCEDURE — 72100 X-RAY EXAM L-S SPINE 2/3 VWS: CPT

## 2023-11-08 DIAGNOSIS — G89.4 CHRONIC PAIN SYNDROME: ICD-10-CM

## 2023-11-08 DIAGNOSIS — G62.9 POLYNEUROPATHY: ICD-10-CM

## 2023-11-08 DIAGNOSIS — G89.29 CHRONIC BILATERAL LOW BACK PAIN WITHOUT SCIATICA: ICD-10-CM

## 2023-11-08 DIAGNOSIS — M54.50 CHRONIC BILATERAL LOW BACK PAIN WITHOUT SCIATICA: ICD-10-CM

## 2023-11-08 DIAGNOSIS — R20.2 BILATERAL LEG PARESTHESIA: ICD-10-CM

## 2023-11-09 RX ORDER — TRAMADOL HYDROCHLORIDE 50 MG/1
50 TABLET ORAL EVERY 6 HOURS PRN
Qty: 120 TABLET | Refills: 2 | Status: SHIPPED | OUTPATIENT
Start: 2023-11-16 | End: 2024-02-14

## 2023-11-09 RX ORDER — TRAMADOL HYDROCHLORIDE 200 MG/1
200 TABLET, EXTENDED RELEASE ORAL DAILY
Qty: 30 TABLET | Refills: 0 | Status: SHIPPED | OUTPATIENT
Start: 2023-11-16 | End: 2023-12-16

## 2023-11-21 ENCOUNTER — TELEPHONE (OUTPATIENT)
Dept: PAIN MANAGEMENT | Age: 53
End: 2023-11-21

## 2023-11-22 ENCOUNTER — HOSPITAL ENCOUNTER (OUTPATIENT)
Dept: PAIN MANAGEMENT | Age: 53
Discharge: HOME | End: 2023-11-22
Payer: COMMERCIAL

## 2023-11-22 VITALS
SYSTOLIC BLOOD PRESSURE: 123 MMHG | RESPIRATION RATE: 18 BRPM | TEMPERATURE: 98.4 F | HEART RATE: 84 BPM | DIASTOLIC BLOOD PRESSURE: 77 MMHG | OXYGEN SATURATION: 98 %

## 2023-11-22 DIAGNOSIS — M53.3 SI (SACROILIAC) JOINT DYSFUNCTION: Primary | ICD-10-CM

## 2023-11-22 PROCEDURE — 20611 DRAIN/INJ JOINT/BURSA W/US: CPT

## 2023-11-22 PROCEDURE — 76942 ECHO GUIDE FOR BIOPSY: CPT | Performed by: NURSE PRACTITIONER

## 2023-11-22 PROCEDURE — 6360000002 HC RX W HCPCS

## 2023-11-22 PROCEDURE — 2500000003 HC RX 250 WO HCPCS

## 2023-11-22 PROCEDURE — 20552 NJX 1/MLT TRIGGER POINT 1/2: CPT | Performed by: NURSE PRACTITIONER

## 2023-11-22 RX ORDER — ETHYL CHLORIDE 100 %
AEROSOL, SPRAY (ML) TOPICAL PRN
Status: DISCONTINUED | OUTPATIENT
Start: 2023-11-22 | End: 2023-11-24 | Stop reason: HOSPADM

## 2023-11-22 RX ORDER — LIDOCAINE HYDROCHLORIDE 10 MG/ML
2 INJECTION, SOLUTION EPIDURAL; INFILTRATION; INTRACAUDAL; PERINEURAL ONCE
Status: DISCONTINUED | OUTPATIENT
Start: 2023-11-22 | End: 2023-11-24 | Stop reason: HOSPADM

## 2023-11-22 RX ORDER — TRIAMCINOLONE ACETONIDE 40 MG/ML
80 INJECTION, SUSPENSION INTRA-ARTICULAR; INTRAMUSCULAR ONCE
Status: DISCONTINUED | OUTPATIENT
Start: 2023-11-22 | End: 2023-11-24 | Stop reason: HOSPADM

## 2023-11-22 RX ORDER — BUPIVACAINE HYDROCHLORIDE 5 MG/ML
2 INJECTION, SOLUTION EPIDURAL; INTRACAUDAL ONCE
Status: DISCONTINUED | OUTPATIENT
Start: 2023-11-22 | End: 2023-11-24 | Stop reason: HOSPADM

## 2023-11-22 NOTE — DISCHARGE INSTRUCTIONS
[address] [facility] Physical And Pain Medicine  Post Procedure Discharge Instructions        YOU HAVE HAD THE FOLLOWING PROCEDURE:                                  [] Occipital Nerve Blocks  [] CTS wrist injection(s)  [] Knee Injection(s)         [] Shoulder Injection(s)   [] Elbow Injection(s)     [] Botox Injection  [] Cervical Trigger Point Injections    [] Thoracic Trigger Point Injections    [] Lumbar Trigger Point Injections  [] Piriformis Trigger Point Injections  [x] SI Joint Injection(s)     [] Trochanteric Bursa Injection(s)       [] Ankle Injection(s)   [] Plantar Fasciitis   []  ______________  Injection(s) [] Botox []  Migraines [] Spasticity    YOU HAVE RECEIVED THE FOLLOWING MEDICATIONS IN YOUR INJECTION(s)  [x] Lidocaine [x] Bupivacaine   [] DepoMedrol (steroid) [] Decadron (steroid)  [x]  Kenalog (steroid)   [] Toradol  [] Supartz [] Jasmeet Hamman    [] Botox        PATIENT INFORMATION:   You may experience the following symptoms after your procedure. These symptoms are normal and should not cause concern: You may have an increase in your pain. This may last 24 - 48 hours after your procedure. You may have no change in the pain that you had prior to your injection(s). You may have weakness or numbness in your affected extremity. If this occurs, this may last until numbing the medication wears off. REPORT THE FOLLOWING SYMPTOMS TO YOUR DOCTOR:  Redness, swelling or drainage at the injection site(s)  Unusual pain that interferes with your normal activities of daily living. OTHER INSTRUCTIONS:    [x] I will apply ice to the injection site(s) for at least 24 hours after the procedure. I will rotate the ice on for 20 minutes and off for 20 minutes for at least 24 hours. [x] I will not apply heat for at least 48 hours and I will not take a hot bath or shower for at least 24 hours.      [x] I understand that if Lidocaine or Bupivacaine was used in my injection(s) that the injection site(s)

## 2023-11-22 NOTE — PROGRESS NOTES
Procedure:  Level of Consciousness: [x]Alert [x]Oriented []Disoriented []Lethargic  Anxiety Level: [x]Calm []Anxious []Depressed []Other  Skin: []Warm [x]Dry []Cool []Moist []Intact []Other  Cardiovascular: [x]Palpitations: []Never []Occasionally []Frequently  Chest Pain: [x]No []Yes  Respiratory:  [x]Unlabored []Labored []Cough ([] Productive []Unproductive)  HCG Required: [x]No []Yes   Results: []Negative []Positive  Knowledge Level:        [x]Patient/Other verbalized understanding of pre-procedure instructions. [x]Assessment of post-op care needs (transportation, responsible caregiver)        [x]Able to discuss health care problems and how to deal with it. Factors that Affect Teaching:        Language Barrier: [x]No []Yes - why:        Hearing Loss:        [x]No []Yes            Corrective Device:  []Yes []No        Vision Loss:           []No [x]Yes            Corrective Device:  [x]Yes []No        Memory Loss:       [x]No []Yes            []Short Term []Long Term  Motivational Level:  [x]Asks Questions                  []Extremely Anxious       [x]Seems Interested               []Seems Uninterested                  [x]Denies need for Education  Risk for Injury:  [x]Patient oriented to person, place and time  []History of frequent falls/loss of balance  Nutritional:  []Change in appetite   []Weight Gain   []Weight Loss  Functional:  []Requires assistance with ADL's Procedure:  Level of Consciousness: []Alert []Oriented []Disoriented []Lethargic  Anxiety Level: []Calm []Anxious []Depressed []Other  Skin: []Warm []Dry []Cool []Moist []Intact []Other  Cardiovascular: []Palpitations: []Never []Occasionally []Frequently  Chest Pain: []No []Yes  Respiratory:  []Unlabored []Labored []Cough ([] Productive []Unproductive)  HCG Required: []No []Yes   Results: []Negative []Positive  Knowledge Level:        []Patient/Other verbalized understanding of pre-procedure instructions.         []Assessment of post-op care

## 2023-11-22 NOTE — TELEPHONE ENCOUNTER
Minimal retrolisthesis at L3-4. There is L3-4 moderate disc space narrowing with associated facet degenerative changes. Bilateral SI joint degeneration noted. Please let patient know results and will discuss more a next appointment.  Electronically signed by MICHAEL Falk CNP on 11/21/2023 at 9:04 PM

## 2023-11-24 NOTE — PROCEDURES
[facility] Physical & Pain Medicine    Patient Name: Sabas Tirado    : 1970    Age: 46 y.o. Sex: female    Date: 2023    Pre-op Diagnosis: bilateral  Sacroiliac Joint(s) Dysfunction/ Sacroiliitis    Post-op Diagnosis: bilateral  Sacroiliac Joint(s) Dysfunction/ Sacroliliits    Procedure: Ultrasound Guided Injection of  bilateral Sacroiliac Joint(s)     Performing Procedure:  JOSEFINA Aguilera    No data found. bilateral  Sacroiliac Joint(s)     Description of Procedure:    After voluntary, informed and signed consent obtained the patient was placed in a prone position. Appropriate time out was obtained per policy. The Sacroiliac Joint(s) was palpated for area of maximal tenderness. The area was prepped in an aseptic fashion with CHG swab. The ultrasound transducer was used to confirm the appropriate location. The skin was sprayed with Gebauer's Solution. Under aseptic technique and direct ultrasound visualization a 22 gauge 3 inch spinal needle was introduced into the Sacroiliac Joint(s). Patient was asked if any new pain was radiating down the ipsilateral lower extremity. If patient noted radiating pain the needle was readjusted until radiating pain was gone. After a negative aspiration, a solution of 1 ml of 0.5% Marcaine Plain and 1 ml of 1% Lidocaine Plain and 1 ml of Kenalog (40 mg/ml) was injected into the Sacroiliac Joint(s). The needle was withdrawn and a sterile dressing applied. If this was a bilateral procedure, the same steps were followed on the opposite side. Discharge: The patient tolerated the procedure well. There were no complications during the procedure and the patient was discharged home with discharge instructions. The patient has been instructed to contact the office should there be any complications or questions to arise between today and their next appointment.     Plan:    Patient to keep previously scheduled follow up

## 2023-12-11 DIAGNOSIS — G89.4 CHRONIC PAIN SYNDROME: ICD-10-CM

## 2023-12-11 DIAGNOSIS — G62.9 POLYNEUROPATHY: ICD-10-CM

## 2023-12-11 DIAGNOSIS — G89.29 CHRONIC BILATERAL LOW BACK PAIN WITHOUT SCIATICA: ICD-10-CM

## 2023-12-11 DIAGNOSIS — M54.50 CHRONIC BILATERAL LOW BACK PAIN WITHOUT SCIATICA: ICD-10-CM

## 2023-12-11 DIAGNOSIS — R20.2 BILATERAL LEG PARESTHESIA: ICD-10-CM

## 2023-12-11 RX ORDER — TRAMADOL HYDROCHLORIDE 200 MG/1
200 TABLET, EXTENDED RELEASE ORAL DAILY
Qty: 30 TABLET | Refills: 0 | Status: SHIPPED | OUTPATIENT
Start: 2023-12-16 | End: 2024-01-15

## 2024-01-10 DIAGNOSIS — G62.9 POLYNEUROPATHY: ICD-10-CM

## 2024-01-10 DIAGNOSIS — M54.50 CHRONIC BILATERAL LOW BACK PAIN WITHOUT SCIATICA: ICD-10-CM

## 2024-01-10 DIAGNOSIS — G89.29 CHRONIC BILATERAL LOW BACK PAIN WITHOUT SCIATICA: ICD-10-CM

## 2024-01-10 DIAGNOSIS — G89.4 CHRONIC PAIN SYNDROME: ICD-10-CM

## 2024-01-10 DIAGNOSIS — R20.2 BILATERAL LEG PARESTHESIA: ICD-10-CM

## 2024-01-10 RX ORDER — TRAMADOL HYDROCHLORIDE 200 MG/1
200 TABLET, EXTENDED RELEASE ORAL DAILY
Qty: 30 TABLET | Refills: 0 | Status: SHIPPED | OUTPATIENT
Start: 2024-01-15 | End: 2024-02-14

## 2024-01-29 ENCOUNTER — HOSPITAL ENCOUNTER (OUTPATIENT)
Dept: PAIN MANAGEMENT | Age: 54
Discharge: HOME | End: 2024-01-29
Payer: COMMERCIAL

## 2024-01-29 VITALS
SYSTOLIC BLOOD PRESSURE: 114 MMHG | HEIGHT: 67 IN | HEART RATE: 81 BPM | DIASTOLIC BLOOD PRESSURE: 77 MMHG | TEMPERATURE: 97.3 F | RESPIRATION RATE: 16 BRPM | BODY MASS INDEX: 38.61 KG/M2 | OXYGEN SATURATION: 95 % | WEIGHT: 246 LBS

## 2024-01-29 DIAGNOSIS — R20.8 ALLODYNIA: ICD-10-CM

## 2024-01-29 DIAGNOSIS — G62.9 POLYNEUROPATHY: ICD-10-CM

## 2024-01-29 DIAGNOSIS — R20.2 BILATERAL LEG PARESTHESIA: ICD-10-CM

## 2024-01-29 DIAGNOSIS — G89.4 CHRONIC PAIN SYNDROME: ICD-10-CM

## 2024-01-29 DIAGNOSIS — G89.29 CHRONIC BILATERAL LOW BACK PAIN WITHOUT SCIATICA: ICD-10-CM

## 2024-01-29 DIAGNOSIS — M54.50 CHRONIC BILATERAL LOW BACK PAIN WITHOUT SCIATICA: ICD-10-CM

## 2024-01-29 PROCEDURE — 99215 OFFICE O/P EST HI 40 MIN: CPT

## 2024-01-29 RX ORDER — TRAMADOL HYDROCHLORIDE 300 MG/1
300 TABLET, EXTENDED RELEASE ORAL DAILY
Qty: 30 TABLET | Refills: 0 | Status: SHIPPED | OUTPATIENT
Start: 2024-02-14 | End: 2024-03-15

## 2024-01-29 RX ORDER — GABAPENTIN 800 MG/1
800 TABLET ORAL 2 TIMES DAILY
Qty: 60 TABLET | Refills: 2 | Status: SHIPPED | OUTPATIENT
Start: 2024-01-29 | End: 2024-04-28

## 2024-01-29 RX ORDER — GABAPENTIN 400 MG/1
400 CAPSULE ORAL DAILY
Qty: 30 CAPSULE | Refills: 2 | Status: SHIPPED | OUTPATIENT
Start: 2024-01-29 | End: 2024-04-28

## 2024-01-29 RX ORDER — LORAZEPAM 0.5 MG/1
0.5 TABLET ORAL 2 TIMES DAILY
COMMUNITY

## 2024-01-29 RX ORDER — TRAMADOL HYDROCHLORIDE 50 MG/1
50 TABLET ORAL EVERY 6 HOURS PRN
Qty: 120 TABLET | Refills: 2 | Status: SHIPPED | OUTPATIENT
Start: 2024-02-14 | End: 2024-05-14

## 2024-01-29 ASSESSMENT — PAIN SCALES - GENERAL: PAINLEVEL_OUTOF10: 9

## 2024-01-29 ASSESSMENT — PAIN DESCRIPTION - PAIN TYPE: TYPE: CHRONIC PAIN

## 2024-01-29 ASSESSMENT — ENCOUNTER SYMPTOMS
CONSTIPATION: 0
BACK PAIN: 1

## 2024-01-29 ASSESSMENT — PAIN DESCRIPTION - ORIENTATION
ORIENTATION_2: RIGHT;LEFT
ORIENTATION: LOWER

## 2024-01-29 ASSESSMENT — PAIN DESCRIPTION - LOCATION
LOCATION_2: LEG
LOCATION: BACK

## 2024-01-29 ASSESSMENT — PAIN DESCRIPTION - INTENSITY: RATING_2: 5

## 2024-01-29 NOTE — PROGRESS NOTES
Pain agreement reviewed with patient and signed per patient. No questions voiced at the present and patient states understanding of agreement. Copy given to patient.

## 2024-01-29 NOTE — PROGRESS NOTES
[facility] Physical & Pain Medicine    Office Visit    Patient Name: Nubia Dasilva    MR #: [id number]    Account #:[id number]    : 1970    Age: 53 y.o.    Sex: female    Date: 10/16/2023    PCP: Rebecca Silver    Chief Complaint:   Chief Complaint   Patient presents with    Back Pain     9/10    Leg Pain     Bilateral, 5/10       History of Present Illness:   The patient is a 53 y.o. female who presents for office visits.     Patient states that her neuropathy and her migraines are under control however she states that her back and her hips are out of control. She states that it takes forever for her to do anything as she has to take rest periods. She states that she has sleep in a recliner or inclined position. She is unable to take stairs. She states that her pain is deep into her hips/groin area. She states that she has more pain getting up from a seated position that sitting down. Patient points to her bilateral SI joints as the source of her pain.    Leg Pain  This is a chronic problem. The current episode started more than 1 year ago. The problem occurs constantly. The problem has been waxing and waning. Associated symptoms include arthralgias, headaches, myalgias and numbness. Pertinent negatives include no neck pain or weakness. Exacerbated by: prolonged sitting or driving.   Back Pain  This is a recurrent problem. The current episode started more than 1 month ago. The problem occurs constantly. The problem has been gradually worsening since onset. The pain is present in the lumbar spine and sacro-iliac. The quality of the pain is described as aching, burning and cramping. The pain is at a severity of 10/10. The symptoms are aggravated by lying down, sitting and standing (walking, stairs). Associated symptoms include headaches, leg pain and numbness. Pertinent negatives include no weakness.         Past Pain Management History  No previous PM    Past Imaging  No recent work

## 2024-01-29 NOTE — TELEPHONE ENCOUNTER
[place] I increased the Ultram ER. Patient will need a new PA before 2/14/2024. Thanks.  
for 30 days. Suppervising MD El Razo [id number] exempt Max Daily Amount: 300 mg  Dispense: 30 tablet; Refill: 0      Requested Prescriptions     Signed Prescriptions Disp Refills    gabapentin (NEURONTIN) 400 MG capsule 30 capsule 2     Sig: Take 1 capsule by mouth daily for 90 days. (Exempt)     Authorizing Provider: CAROLINA COSTA    gabapentin (NEURONTIN) 800 MG tablet 60 tablet 2     Sig: Take 1 tablet by mouth 2 times daily for 90 days. exempt     Authorizing Provider: CAROLINA COSTA    traMADol (ULTRAM) 50 MG tablet 120 tablet 2     Sig: Take 1 tablet by mouth every 6 hours as needed for Pain (exempt) for up to 90 days.     Authorizing Provider: CAROLINA COSTA    traMADol (ULTRAM ER) 300 MG extended release tablet 30 tablet 0     Sig: Take 1 tablet by mouth daily for 30 days. Suppervising MD El Razo [id number] exempt Max Daily Amount: 300 mg     Authorizing Provider: CAROLINA COSTA     Increasing tramadol ER to 300 for better pain coverage.     Continue medication with refill sent at appointment yes; refill sent to medical director at appointment no, see refill encounter dated 1/29/2024    Electronically signed by MICHAEL De Santiago CNP on 1/29/2024 at 5:18 PM

## 2024-01-29 NOTE — PROGRESS NOTES
Clinic Documentation      Education Provided:  [x] Review of Sam  [x] Agreement Review  [x] PEG Score Calculated [x] PHQ Score Calculated [x] ORT Score Calculated    [] Compliance Issues Discussed [] Cognitive Behavior Needs [x] Exercise [] Review of Test [] Financial Issues  [x] Tobacco/Alcohol Use Reviewed [x] Teaching [] New Patient [] Picture Obtained    Physician Plan:  [] Outgoing Referral  [] Pharmacy Consult  [x] Test Ordered [x] Prescription Ordered/Changed   [] Obtained Test Results / Consult Notes        Complete if patient is withholding blood thinner for procedure     Blood Thinner Patient is currently taking:      [] Plavix (Hold for 7 days)  [] Aspirin (Hold for 5 days)     [] Pletal (Hold for 2 days)  [] Pradaxa (Hold for 3 days)    [] Effient (Hold for 7 days)  [] Xarelto (Hold for 2 days)    [] Eliquis (Hold for 2 days)  [] Brilinta (Hold for 7 days)    [] Coumadin (Hold for 5 days) - (INR needs to be drawn the day prior to procedure- INR < 2.0)    [] Aggrenox (Hold for 7 days)        [] Patient will stop medication on their own.    [] Blood Thinner Form Faxed for approval to hold.   Provider form faxed to:     Assessment Completed by:  Electronically signed by Rosie Khan RN on 1/29/2024 at 4:47 PM

## 2024-02-27 PROBLEM — Z02.89 PAIN MEDICATION AGREEMENT: Status: ACTIVE | Noted: 2024-02-27

## 2024-02-27 ASSESSMENT — ENCOUNTER SYMPTOMS: BACK PAIN: 1

## 2024-02-28 ENCOUNTER — HOSPITAL ENCOUNTER (OUTPATIENT)
Dept: PAIN MANAGEMENT | Age: 54
Discharge: HOME | End: 2024-02-28
Payer: COMMERCIAL

## 2024-02-28 VITALS
TEMPERATURE: 97.3 F | HEART RATE: 84 BPM | SYSTOLIC BLOOD PRESSURE: 109 MMHG | DIASTOLIC BLOOD PRESSURE: 65 MMHG | OXYGEN SATURATION: 95 % | RESPIRATION RATE: 18 BRPM

## 2024-02-28 DIAGNOSIS — M62.830 SPASM OF THORACIC BACK MUSCLE: ICD-10-CM

## 2024-02-28 DIAGNOSIS — M62.838 CERVICAL PARASPINAL MUSCLE SPASM: Primary | ICD-10-CM

## 2024-02-28 PROCEDURE — 6360000002 HC RX W HCPCS

## 2024-02-28 PROCEDURE — 20553 NJX 1/MLT TRIGGER POINTS 3/>: CPT

## 2024-02-28 PROCEDURE — 20611 DRAIN/INJ JOINT/BURSA W/US: CPT

## 2024-02-28 PROCEDURE — 20553 NJX 1/MLT TRIGGER POINTS 3/>: CPT | Performed by: NURSE PRACTITIONER

## 2024-02-28 PROCEDURE — 76942 ECHO GUIDE FOR BIOPSY: CPT | Performed by: NURSE PRACTITIONER

## 2024-02-28 PROCEDURE — 2500000003 HC RX 250 WO HCPCS

## 2024-02-28 RX ORDER — LIDOCAINE HYDROCHLORIDE 10 MG/ML
15 INJECTION, SOLUTION EPIDURAL; INFILTRATION; INTRACAUDAL; PERINEURAL ONCE
Status: DISCONTINUED | OUTPATIENT
Start: 2024-02-28 | End: 2024-03-01 | Stop reason: HOSPADM

## 2024-02-28 RX ORDER — ETHYL CHLORIDE 100 %
AEROSOL, SPRAY (ML) TOPICAL PRN
Status: DISCONTINUED | OUTPATIENT
Start: 2024-02-28 | End: 2024-03-01 | Stop reason: HOSPADM

## 2024-02-28 RX ORDER — BUPIVACAINE HYDROCHLORIDE 5 MG/ML
2 INJECTION, SOLUTION EPIDURAL; INTRACAUDAL ONCE
Status: DISCONTINUED | OUTPATIENT
Start: 2024-02-28 | End: 2024-03-01 | Stop reason: HOSPADM

## 2024-02-28 RX ORDER — TRIAMCINOLONE ACETONIDE 40 MG/ML
80 INJECTION, SUSPENSION INTRA-ARTICULAR; INTRAMUSCULAR ONCE
Status: DISCONTINUED | OUTPATIENT
Start: 2024-02-28 | End: 2024-03-01 | Stop reason: HOSPADM

## 2024-02-28 RX ORDER — LIDOCAINE HYDROCHLORIDE 10 MG/ML
2 INJECTION, SOLUTION EPIDURAL; INFILTRATION; INTRACAUDAL; PERINEURAL ONCE
Status: DISCONTINUED | OUTPATIENT
Start: 2024-02-28 | End: 2024-03-01 | Stop reason: HOSPADM

## 2024-02-28 RX ORDER — BUPIVACAINE HYDROCHLORIDE 5 MG/ML
15 INJECTION, SOLUTION EPIDURAL; INTRACAUDAL ONCE
Status: DISCONTINUED | OUTPATIENT
Start: 2024-02-28 | End: 2024-03-01 | Stop reason: HOSPADM

## 2024-02-28 NOTE — DISCHARGE INSTRUCTIONS
[facility] Physical And Pain Medicine  Post Procedure Discharge Instructions        YOU HAVE HAD THE FOLLOWING PROCEDURE:                                  [] Occipital Nerve Blocks  [] CTS wrist injection(s)  [] Knee Injection(s)         [] Shoulder Injection(s)   [] Elbow Injection(s)     [] Botox Injection  [x] Cervical Trigger Point Injections    [x] Thoracic Trigger Point Injections    [] Lumbar Trigger Point Injections  [] Piriformis Trigger Point Injections  [] SI Joint Injection(s)     [] Trochanteric Bursa Injection(s)       [] Ankle Injection(s)   [] Plantar Fasciitis   []  ______________  Injection(s) [] Botox []  Migraines [] Spasticity    YOU HAVE RECEIVED THE FOLLOWING MEDICATIONS IN YOUR INJECTION(s)  [x] Lidocaine [x] Bupivacaine   [] DepoMedrol (steroid) [] Decadron (steroid)  [x]  Kenalog (steroid)   [] Toradol  [] Supartz [] Zilretta    [] Botox        PATIENT INFORMATION:   You may experience the following symptoms after your procedure. These symptoms are normal and should not cause concern:    You may have an increase in your pain. This may last 24 - 48 hours after your procedure.  You may have no change in the pain that you had prior to your injection(s).  You may have weakness or numbness in your affected extremity. If this occurs, this may last until numbing the medication wears off.     REPORT THE FOLLOWING SYMPTOMS TO YOUR DOCTOR:  Redness, swelling or drainage at the injection site(s)  Unusual pain that interferes with your normal activities of daily living.    OTHER INSTRUCTIONS:    [x] I will apply ice to the injection site(s) for at least 24 hours after the procedure. I will rotate the ice on for 20 minutes and off for 20 minutes for at least 24 hours.    [x] I will not apply heat for at least 48 hours and I will not take a hot bath or shower for at least 24 hours.     [x] I understand that if Lidocaine or Bupivacaine was used in my injection(s) that the injection site(s)

## 2024-02-28 NOTE — PROCEDURES
[facility] Physical & Pain Medicine    Patient Name: Nubia Dasilva    : 1970    Age: 53 y.o.    Sex: female    Date: 2024    Pre-op Diagnosis: bilateral  Sacroiliac Joint(s) Dysfunction/ Sacroiliitis    Post-op Diagnosis: bilateral  Sacroiliac Joint(s) Dysfunction/ Sacroliliits    Procedure: Ultrasound Guided Injection of  bilateral Sacroiliac Joint(s)     Performing Procedure:  JOSEFINA Florence    Patient Vitals for the past 24 hrs:   BP Temp Temp src Pulse Resp SpO2   24 0750 109/65 97.3 °F (36.3 °C) Temporal 84 18 95 %       bilateral  Sacroiliac Joint(s)     Description of Procedure:    After voluntary, informed and signed consent obtained the patient was placed in a prone position. Appropriate time out was obtained per policy. The Sacroiliac Joint(s) was palpated for area of maximal tenderness. The area was prepped in an aseptic fashion with CHG swab. The ultrasound transducer was used to confirm the appropriate location. The skin was sprayed with Gebauer's Solution. Under aseptic technique and direct ultrasound visualization a 22 gauge 3 inch spinal needle was introduced into the Sacroiliac Joint(s). Patient was asked if any new pain was radiating down the ipsilateral lower extremity. If patient noted radiating pain the needle was readjusted until radiating pain was gone. After a negative aspiration, a solution of 1 ml of 0.5% Marcaine Plain and 1 ml of 1% Lidocaine Plain and 1 ml of Kenalog (40 mg/ml) was injected into the Sacroiliac Joint(s). The needle was withdrawn and a sterile dressing applied. If this was a bilateral procedure, the same steps were followed on the opposite side.     Pre-op Diagnosis: Myofascial Pain/ Muscle Spasms/ Cervicalgia    Post-op Diagnosis: Myofascial Pain/ Muscle Spasms/ Cervicalgia    Procedure: Cervical Trigger Point Injections    Performing Procedure: JOSEFINA Florence    Patient Vitals for the past 24 hrs:   BP

## 2024-02-28 NOTE — PROGRESS NOTES
Procedure:  Level of Consciousness: [x]Alert [x]Oriented []Disoriented []Lethargic  Anxiety Level: [x]Calm []Anxious []Depressed []Other  Skin: [x]Warm [x]Dry []Cool []Moist []Intact []Other  Cardiovascular: []Palpitations: [x]Never []Occasionally []Frequently  Chest Pain: [x]No []Yes  Respiratory:  [x]Unlabored []Labored []Cough ([] Productive []Unproductive)  HCG Required: [x]No []Yes   Results: []Negative []Positive  Knowledge Level:        [x]Patient/Other verbalized understanding of pre-procedure instructions.        [x]Assessment of post-op care needs (transportation, responsible caregiver)        [x]Able to discuss health care problems and how to deal with it.  Factors that Affect Teaching:        Language Barrier: [x]No []Yes - why:        Hearing Loss:        [x]No []Yes            Corrective Device:  []Yes []No        Vision Loss:           []No [x]Yes            Corrective Device:  [x]Yes []No        Memory Loss:       [x]No []Yes            []Short Term []Long Term  Motivational Level:  [x]Asks Questions                  []Extremely Anxious       [x]Seems Interested               []Seems Uninterested                  [x]Denies need for Education  Risk for Injury:  [x]Patient oriented to person, place and time  []History of frequent falls/loss of balance  Nutritional:  []Change in appetite   []Weight Gain   []Weight Loss  Functional:  []Requires assistance with ADL's

## 2024-03-07 ENCOUNTER — TELEPHONE (OUTPATIENT)
Dept: PAIN MANAGEMENT | Age: 54
End: 2024-03-07

## 2024-03-11 DIAGNOSIS — G89.29 CHRONIC BILATERAL LOW BACK PAIN WITHOUT SCIATICA: ICD-10-CM

## 2024-03-11 DIAGNOSIS — G89.4 CHRONIC PAIN SYNDROME: ICD-10-CM

## 2024-03-11 DIAGNOSIS — M54.50 CHRONIC BILATERAL LOW BACK PAIN WITHOUT SCIATICA: ICD-10-CM

## 2024-03-11 RX ORDER — TRAMADOL HYDROCHLORIDE 300 MG/1
300 TABLET, EXTENDED RELEASE ORAL DAILY
Qty: 30 TABLET | Refills: 0 | Status: SHIPPED | OUTPATIENT
Start: 2024-03-15 | End: 2024-04-14

## 2024-03-25 DIAGNOSIS — G89.4 CHRONIC PAIN SYNDROME: ICD-10-CM

## 2024-03-25 RX ORDER — IBUPROFEN 800 MG/1
800 TABLET ORAL EVERY 8 HOURS PRN
Qty: 90 TABLET | Refills: 2 | Status: SHIPPED | OUTPATIENT
Start: 2024-03-25

## 2024-04-08 DIAGNOSIS — M54.50 CHRONIC BILATERAL LOW BACK PAIN WITHOUT SCIATICA: ICD-10-CM

## 2024-04-08 DIAGNOSIS — G89.29 CHRONIC BILATERAL LOW BACK PAIN WITHOUT SCIATICA: ICD-10-CM

## 2024-04-08 DIAGNOSIS — G89.4 CHRONIC PAIN SYNDROME: ICD-10-CM

## 2024-04-09 RX ORDER — TRAMADOL HYDROCHLORIDE 300 MG/1
300 TABLET, EXTENDED RELEASE ORAL DAILY
Qty: 30 TABLET | Refills: 0 | Status: SHIPPED | OUTPATIENT
Start: 2024-04-14 | End: 2024-05-14

## 2024-04-29 DIAGNOSIS — G43.019 INTRACTABLE MIGRAINE WITHOUT AURA AND WITHOUT STATUS MIGRAINOSUS: ICD-10-CM

## 2024-04-29 DIAGNOSIS — G89.4 CHRONIC PAIN SYNDROME: ICD-10-CM

## 2024-04-29 DIAGNOSIS — G89.29 CHRONIC BILATERAL LOW BACK PAIN WITHOUT SCIATICA: ICD-10-CM

## 2024-04-29 DIAGNOSIS — R20.2 BILATERAL LEG PARESTHESIA: ICD-10-CM

## 2024-04-29 DIAGNOSIS — M54.50 CHRONIC BILATERAL LOW BACK PAIN WITHOUT SCIATICA: ICD-10-CM

## 2024-04-29 DIAGNOSIS — R20.8 ALLODYNIA: ICD-10-CM

## 2024-04-29 DIAGNOSIS — G62.9 POLYNEUROPATHY: ICD-10-CM

## 2024-04-30 RX ORDER — ATOGEPANT 60 MG/1
60 TABLET ORAL DAILY
Qty: 30 TABLET | Refills: 2 | Status: SHIPPED | OUTPATIENT
Start: 2024-04-30 | End: 2024-05-30

## 2024-04-30 RX ORDER — TRAMADOL HYDROCHLORIDE 300 MG/1
300 TABLET, EXTENDED RELEASE ORAL DAILY
Qty: 30 TABLET | Refills: 0 | Status: SHIPPED | OUTPATIENT
Start: 2024-05-14 | End: 2024-06-13

## 2024-04-30 RX ORDER — GABAPENTIN 800 MG/1
800 TABLET ORAL 2 TIMES DAILY
Qty: 60 TABLET | Refills: 2 | Status: SHIPPED | OUTPATIENT
Start: 2024-05-12 | End: 2024-08-10

## 2024-04-30 RX ORDER — GABAPENTIN 400 MG/1
400 CAPSULE ORAL DAILY
Qty: 30 CAPSULE | Refills: 2 | Status: SHIPPED | OUTPATIENT
Start: 2024-05-12 | End: 2024-08-10

## 2024-04-30 RX ORDER — UBROGEPANT 100 MG/1
TABLET ORAL
Qty: 16 TABLET | Refills: 5 | Status: SHIPPED | OUTPATIENT
Start: 2024-04-30

## 2024-05-06 ENCOUNTER — HOSPITAL ENCOUNTER (OUTPATIENT)
Dept: PAIN MANAGEMENT | Age: 54
Discharge: HOME | End: 2024-05-06
Payer: COMMERCIAL

## 2024-05-06 VITALS
DIASTOLIC BLOOD PRESSURE: 73 MMHG | BODY MASS INDEX: 40.02 KG/M2 | RESPIRATION RATE: 16 BRPM | TEMPERATURE: 96.8 F | HEART RATE: 77 BPM | OXYGEN SATURATION: 93 % | SYSTOLIC BLOOD PRESSURE: 114 MMHG | HEIGHT: 67 IN | WEIGHT: 255 LBS

## 2024-05-06 DIAGNOSIS — G89.4 CHRONIC PAIN SYNDROME: ICD-10-CM

## 2024-05-06 DIAGNOSIS — G89.29 CHRONIC BILATERAL LOW BACK PAIN WITHOUT SCIATICA: ICD-10-CM

## 2024-05-06 DIAGNOSIS — R20.2 BILATERAL LEG PARESTHESIA: ICD-10-CM

## 2024-05-06 DIAGNOSIS — G43.019 INTRACTABLE MIGRAINE WITHOUT AURA AND WITHOUT STATUS MIGRAINOSUS: ICD-10-CM

## 2024-05-06 DIAGNOSIS — M54.50 CHRONIC BILATERAL LOW BACK PAIN WITHOUT SCIATICA: ICD-10-CM

## 2024-05-06 DIAGNOSIS — G62.9 POLYNEUROPATHY: ICD-10-CM

## 2024-05-06 PROCEDURE — 99213 OFFICE O/P EST LOW 20 MIN: CPT

## 2024-05-06 PROCEDURE — 99214 OFFICE O/P EST MOD 30 MIN: CPT | Performed by: NURSE PRACTITIONER

## 2024-05-06 RX ORDER — TRAMADOL HYDROCHLORIDE 50 MG/1
50 TABLET ORAL EVERY 6 HOURS PRN
Qty: 120 TABLET | Refills: 0 | Status: SHIPPED | OUTPATIENT
Start: 2024-05-06 | End: 2024-06-05

## 2024-05-06 RX ORDER — DULOXETIN HYDROCHLORIDE 60 MG/1
60 CAPSULE, DELAYED RELEASE ORAL NIGHTLY
Qty: 30 CAPSULE | Refills: 0 | Status: SHIPPED | OUTPATIENT
Start: 2024-05-06 | End: 2024-06-05

## 2024-05-06 ASSESSMENT — PAIN DESCRIPTION - ORIENTATION
ORIENTATION_2: RIGHT;LEFT
ORIENTATION: LOWER

## 2024-05-06 ASSESSMENT — ENCOUNTER SYMPTOMS
CONSTIPATION: 0
BACK PAIN: 1

## 2024-05-06 ASSESSMENT — PAIN DESCRIPTION - LOCATION
LOCATION_2: LEG
LOCATION: BACK

## 2024-05-06 ASSESSMENT — PAIN DESCRIPTION - PAIN TYPE: TYPE: CHRONIC PAIN

## 2024-05-06 ASSESSMENT — PAIN SCALES - GENERAL: PAINLEVEL_OUTOF10: 3

## 2024-05-06 ASSESSMENT — PAIN DESCRIPTION - INTENSITY: RATING_2: 1

## 2024-05-06 NOTE — PROGRESS NOTES
Clinic Documentation      Education Provided:  [x] Review of Sam  [] Agreement Review  [x] PEG Score Calculated [] PHQ Score Calculated [] ORT Score Calculated    [] Compliance Issues Discussed [] Cognitive Behavior Needs [x] Exercise [] Review of Test [] Financial Issues  [x] Tobacco/Alcohol Use Reviewed [x] Teaching [] New Patient [] Picture Obtained    Physician Plan:  [] Outgoing Referral  [] Pharmacy Consult  [] Test Ordered [x] Prescription Ordered/Changed   [] Obtained Test Results / Consult Notes        Complete if patient is withholding blood thinner for procedure     Blood Thinner Patient is currently taking:      [] Plavix (Hold for 7 days)  [] Aspirin (Hold for 5 days)     [] Pletal (Hold for 2 days)  [] Pradaxa (Hold for 3 days)    [] Effient (Hold for 7 days)  [] Xarelto (Hold for 2 days)    [] Eliquis (Hold for 2 days)  [] Brilinta (Hold for 7 days)    [] Coumadin (Hold for 5 days) - (INR needs to be drawn the day prior to procedure- INR < 2.0)    [] Aggrenox (Hold for 7 days)        [] Patient will stop medication on their own.    [] Blood Thinner Form Faxed for approval to hold.   Provider form faxed to:     Assessment Completed by:  Electronically signed by Rosie Khan RN on 5/6/2024 at 1:45 PM  
children: 2    Years of education: None    Highest education level: None   Tobacco Use    Smoking status: Every Day     Current packs/day: 2.00     Types: Cigarettes    Smokeless tobacco: Never   Vaping Use    Vaping Use: Never used   Substance and Sexual Activity    Alcohol use: No    Drug use: No    Sexual activity: Not Currently     Partners: Male         Family History  family history includes Depression in her mother and sister; Heart Attack in her paternal grandfather and paternal grandmother; Heart Disease in her father; Hypertension in her mother; Stroke in her paternal grandfather and paternal grandmother.    Review of Systems:  Review of Systems   Constitutional:  Negative for activity change.   Gastrointestinal:  Negative for constipation.   Musculoskeletal:  Positive for arthralgias, back pain, gait problem and myalgias. Negative for neck pain.   Neurological:  Positive for numbness and headaches. Negative for weakness.        Paresthesia bilateral LE   Psychiatric/Behavioral:  Positive for sleep disturbance. Negative for agitation, self-injury and suicidal ideas. The patient is not nervous/anxious.         14 point ROS negative besides that noted in HPI    Physical exam:     Vitals:    05/06/24 1305   BP: 114/73   Pulse: 77   Resp: 16   Temp: 96.8 °F (36 °C)   TempSrc: Temporal   SpO2: 93%   Weight: 115.7 kg (255 lb)   Height: 1.702 m (5' 7\")       Body mass index is 39.94 kg/m².    Physical Exam  Vitals and nursing note reviewed.   Constitutional:       General: She is not in acute distress.     Appearance: Normal appearance. She is well-developed.   HENT:      Head: Normocephalic.      Right Ear: External ear normal.      Left Ear: External ear normal.      Nose: Nose normal.   Eyes:      Conjunctiva/sclera: Conjunctivae normal.      Pupils: Pupils are equal, round, and reactive to light.   Neck:      Vascular: No JVD.      Trachea: No tracheal deviation.   Cardiovascular:      Rate and Rhythm: Normal

## 2024-05-06 NOTE — TELEPHONE ENCOUNTER
1. Chronic pain syndrome  - DULoxetine (CYMBALTA) 60 MG extended release capsule; Take 1 capsule by mouth nightly  Dispense: 30 capsule; Refill: 0  - traMADol (ULTRAM) 50 MG tablet; Take 1 tablet by mouth every 6 hours as needed for Pain (exempt) for up to 30 days. Max Daily Amount: 200 mg  Dispense: 120 tablet; Refill: 0    2. Polyneuropathy  - DULoxetine (CYMBALTA) 60 MG extended release capsule; Take 1 capsule by mouth nightly  Dispense: 30 capsule; Refill: 0  - traMADol (ULTRAM) 50 MG tablet; Take 1 tablet by mouth every 6 hours as needed for Pain (exempt) for up to 30 days. Max Daily Amount: 200 mg  Dispense: 120 tablet; Refill: 0    3. Intractable migraine without aura and without status migrainosus  - DULoxetine (CYMBALTA) 60 MG extended release capsule; Take 1 capsule by mouth nightly  Dispense: 30 capsule; Refill: 0    4. Bilateral leg paresthesia  - traMADol (ULTRAM) 50 MG tablet; Take 1 tablet by mouth every 6 hours as needed for Pain (exempt) for up to 30 days. Max Daily Amount: 200 mg  Dispense: 120 tablet; Refill: 0    5. Chronic bilateral low back pain without sciatica  - traMADol (ULTRAM) 50 MG tablet; Take 1 tablet by mouth every 6 hours as needed for Pain (exempt) for up to 30 days. Max Daily Amount: 200 mg  Dispense: 120 tablet; Refill: 0      Requested Prescriptions     Signed Prescriptions Disp Refills    DULoxetine (CYMBALTA) 60 MG extended release capsule 30 capsule 0     Sig: Take 1 capsule by mouth nightly     Authorizing Provider: CAROLINA COSTA    traMADol (ULTRAM) 50 MG tablet 120 tablet 0     Sig: Take 1 tablet by mouth every 6 hours as needed for Pain (exempt) for up to 30 days. Max Daily Amount: 200 mg     Authorizing Provider: CAROLINA COSTA       Continue medication with refill sent at appointment yes; refill sent to medical director at appointment no, see refill encounter dated 5/6/2024    Electronically signed by MICHAEL De Santiago CNP on 5/6/2024 at 1:59 PM

## 2024-05-28 DIAGNOSIS — G43.019 INTRACTABLE MIGRAINE WITHOUT AURA AND WITHOUT STATUS MIGRAINOSUS: ICD-10-CM

## 2024-05-28 DIAGNOSIS — G89.4 CHRONIC PAIN SYNDROME: ICD-10-CM

## 2024-05-28 DIAGNOSIS — R20.2 BILATERAL LEG PARESTHESIA: ICD-10-CM

## 2024-05-28 DIAGNOSIS — M54.50 CHRONIC BILATERAL LOW BACK PAIN WITHOUT SCIATICA: ICD-10-CM

## 2024-05-28 DIAGNOSIS — G62.9 POLYNEUROPATHY: ICD-10-CM

## 2024-05-28 DIAGNOSIS — G89.29 CHRONIC BILATERAL LOW BACK PAIN WITHOUT SCIATICA: ICD-10-CM

## 2024-05-29 ENCOUNTER — HOSPITAL ENCOUNTER (OUTPATIENT)
Dept: PAIN MANAGEMENT | Age: 54
Discharge: HOME | End: 2024-05-29
Payer: COMMERCIAL

## 2024-05-29 VITALS
SYSTOLIC BLOOD PRESSURE: 115 MMHG | HEART RATE: 84 BPM | OXYGEN SATURATION: 100 % | RESPIRATION RATE: 16 BRPM | TEMPERATURE: 97.2 F | DIASTOLIC BLOOD PRESSURE: 75 MMHG

## 2024-05-29 DIAGNOSIS — M79.18 MYALGIA, MULTIPLE SITES: Primary | ICD-10-CM

## 2024-05-29 PROCEDURE — 2500000003 HC RX 250 WO HCPCS

## 2024-05-29 PROCEDURE — 6360000002 HC RX W HCPCS

## 2024-05-29 PROCEDURE — 20553 NJX 1/MLT TRIGGER POINTS 3/>: CPT

## 2024-05-29 PROCEDURE — 20611 DRAIN/INJ JOINT/BURSA W/US: CPT

## 2024-05-29 PROCEDURE — 76942 ECHO GUIDE FOR BIOPSY: CPT | Performed by: NURSE PRACTITIONER

## 2024-05-29 PROCEDURE — 20553 NJX 1/MLT TRIGGER POINTS 3/>: CPT | Performed by: NURSE PRACTITIONER

## 2024-05-29 RX ORDER — LIDOCAINE HYDROCHLORIDE 10 MG/ML
2 INJECTION, SOLUTION EPIDURAL; INFILTRATION; INTRACAUDAL; PERINEURAL ONCE
Status: DISCONTINUED | OUTPATIENT
Start: 2024-05-29 | End: 2024-05-31 | Stop reason: HOSPADM

## 2024-05-29 RX ORDER — LIDOCAINE HYDROCHLORIDE 10 MG/ML
15 INJECTION, SOLUTION EPIDURAL; INFILTRATION; INTRACAUDAL; PERINEURAL ONCE
Status: DISCONTINUED | OUTPATIENT
Start: 2024-05-29 | End: 2024-05-31 | Stop reason: HOSPADM

## 2024-05-29 RX ORDER — ETHYL CHLORIDE 100 %
AEROSOL, SPRAY (ML) TOPICAL PRN
Status: DISCONTINUED | OUTPATIENT
Start: 2024-05-29 | End: 2024-05-31 | Stop reason: HOSPADM

## 2024-05-29 RX ORDER — BUPIVACAINE HYDROCHLORIDE 5 MG/ML
2 INJECTION, SOLUTION EPIDURAL; INTRACAUDAL ONCE
Status: DISCONTINUED | OUTPATIENT
Start: 2024-05-29 | End: 2024-05-31 | Stop reason: HOSPADM

## 2024-05-29 RX ORDER — TRIAMCINOLONE ACETONIDE 40 MG/ML
80 INJECTION, SUSPENSION INTRA-ARTICULAR; INTRAMUSCULAR ONCE
Status: DISCONTINUED | OUTPATIENT
Start: 2024-05-29 | End: 2024-05-31 | Stop reason: HOSPADM

## 2024-05-29 RX ORDER — BUPIVACAINE HYDROCHLORIDE 5 MG/ML
15 INJECTION, SOLUTION EPIDURAL; INTRACAUDAL ONCE
Status: DISCONTINUED | OUTPATIENT
Start: 2024-05-29 | End: 2024-05-31 | Stop reason: HOSPADM

## 2024-05-29 NOTE — DISCHARGE INSTRUCTIONS
[facility] Physical And Pain Medicine  Post Procedure Discharge Instructions        YOU HAVE HAD THE FOLLOWING PROCEDURE:                                  [] Occipital Nerve Blocks  [] CTS wrist injection(s)  [] Knee Injection(s)         [] Shoulder Injection(s)   [] Elbow Injection(s)     [] Botox Injection  [x] Cervical Trigger Point Injections    [x] Thoracic Trigger Point Injections    [] Lumbar Trigger Point Injections  [] Piriformis Trigger Point Injections  [x] SI Joint Injection(s)     [] Trochanteric Bursa Injection(s)       [] Ankle Injection(s)   [] Plantar Fasciitis   []  ______________  Injection(s) [] Botox []  Migraines [] Spasticity    YOU HAVE RECEIVED THE FOLLOWING MEDICATIONS IN YOUR INJECTION(s)  [x] Lidocaine [x] Bupivacaine   [] DepoMedrol (steroid) [] Decadron (steroid)  [x]  Kenalog (steroid)   [] Toradol  [] Supartz [] Zilretta    [] Botox        PATIENT INFORMATION:   You may experience the following symptoms after your procedure. These symptoms are normal and should not cause concern:    You may have an increase in your pain. This may last 24 - 48 hours after your procedure.  You may have no change in the pain that you had prior to your injection(s).  You may have weakness or numbness in your affected extremity. If this occurs, this may last until numbing the medication wears off.     REPORT THE FOLLOWING SYMPTOMS TO YOUR DOCTOR:  Redness, swelling or drainage at the injection site(s)  Unusual pain that interferes with your normal activities of daily living.    OTHER INSTRUCTIONS:    [x] I will apply ice to the injection site(s) for at least 24 hours after the procedure. I will rotate the ice on for 20 minutes and off for 20 minutes for at least 24 hours.    [x] I will not apply heat for at least 48 hours and I will not take a hot bath or shower for at least 24 hours.     [x] I understand that if Lidocaine or Bupivacaine was used in my injection(s) that the injection

## 2024-05-30 NOTE — PROCEDURES
PROCEDURE NOTE  Date: 2024   Name: Nubia Dasilva  YOB: 1970    Procedures        [facility] Physical & Pain Medicine    Patient Name: Nubia Dasilva    : 1970                    Age: 53 y.o.    Sex: female    Date: May 29, 2024    Pre-op Diagnosis: Myofascial Pain/ Myalgia     Post-op Diagnosis: Myofascial Pain/ Myalgia    Procedure: Trigger Trigger Point Injections    Performing Procedure: JOSEFINA [place]    Patient Vitals for the past 24 hrs:   BP Temp Temp src Pulse Resp SpO2   24 1002 115/75 97.2 °F (36.2 °C) Temporal 84 16 100 %       [x] Cervical Trigger Point Injections [x] Thoracic Trigger Point Injections [] Lumbar Trigger Point Injections [] Piriformis Trigger Point Injections    PROCEDURE:      [x] Cervical Trigger Point Injections    Description of Procedure:  After a brief physical assessment and failure to improve with conservative measures the patient presented for Cervical Trigger Point Injections The indications, limitations and possible complications were discussed with the patient and the patient elected to proceed with the procedure.    After voluntary, informed and signed consent obtained the patient was placed in a seated position.     Appropriate time out was obtained per policy.     The area of maximal tenderness was palpated over the  bilaterally Cervical muscles - Splenius  Trapezius  Rhomboid The skin overlying these areas was marked with a skin marker. The skin overlying the proposed injection sites were then sprayed with Gebauer's Solution while protecting patient eyes. The areas were prepped using aseptic technique with CHG prep. Each trigger point of the Cervical muscles - Splenius  Trapezius  Rhomboid was injected after negative aspiration with approximately 1- 2 ml     [x] Plain solution of 5 ml of 1% Lidocaine Plain and 5 ml of 0.5% Marcaine Plain     OR    With a solution of 4.5 ml of 1% Lidocaine plan and 4.5 ml of

## 2024-05-31 RX ORDER — TRAMADOL HYDROCHLORIDE 50 MG/1
50 TABLET ORAL EVERY 6 HOURS PRN
Qty: 120 TABLET | Refills: 0 | Status: SHIPPED | OUTPATIENT
Start: 2024-06-05 | End: 2024-07-05

## 2024-05-31 RX ORDER — IBUPROFEN 800 MG/1
800 TABLET ORAL EVERY 8 HOURS PRN
Qty: 90 TABLET | Refills: 2 | Status: SHIPPED | OUTPATIENT
Start: 2024-05-31

## 2024-05-31 RX ORDER — TRAMADOL HYDROCHLORIDE 300 MG/1
300 TABLET, EXTENDED RELEASE ORAL DAILY
Qty: 30 TABLET | Refills: 0 | Status: SHIPPED | OUTPATIENT
Start: 2024-06-15 | End: 2024-07-15

## 2024-05-31 RX ORDER — DULOXETIN HYDROCHLORIDE 60 MG/1
60 CAPSULE, DELAYED RELEASE ORAL NIGHTLY
Qty: 30 CAPSULE | Refills: 0 | Status: SHIPPED | OUTPATIENT
Start: 2024-05-31 | End: 2024-06-30

## 2024-07-01 ENCOUNTER — TELEPHONE (OUTPATIENT)
Dept: PAIN MANAGEMENT | Age: 54
End: 2024-07-01

## 2024-07-01 NOTE — TELEPHONE ENCOUNTER
Returning a voicemail message left 6/28/24 (not open on Friday's). Patient received lab results & her sodium, potassium & glucose were low, wanted to know if the injections she got in 5/29/24 could have effected her labs. Told her she gets Kenalog, Lidocaine & Bupivacaine. Kenalog can increase blood sugar especially in Type 2 diabetics. Lidocaine & Bupivacaine are short acting 2-6 hours. She had her labs last week and is has almost been a month since injections, so no labs wouldn't be affected.

## 2024-07-02 ENCOUNTER — HOSPITAL ENCOUNTER (OUTPATIENT)
Dept: PAIN MANAGEMENT | Age: 54
Discharge: HOME | End: 2024-07-02
Payer: COMMERCIAL

## 2024-07-02 VITALS
BODY MASS INDEX: 41.14 KG/M2 | WEIGHT: 256 LBS | OXYGEN SATURATION: 94 % | TEMPERATURE: 96.5 F | HEIGHT: 66 IN | DIASTOLIC BLOOD PRESSURE: 86 MMHG | RESPIRATION RATE: 18 BRPM | HEART RATE: 86 BPM | SYSTOLIC BLOOD PRESSURE: 124 MMHG

## 2024-07-02 DIAGNOSIS — M54.50 CHRONIC BILATERAL LOW BACK PAIN WITHOUT SCIATICA: Primary | ICD-10-CM

## 2024-07-02 DIAGNOSIS — M53.3 SI (SACROILIAC) JOINT DYSFUNCTION: ICD-10-CM

## 2024-07-02 DIAGNOSIS — G89.29 CHRONIC BILATERAL LOW BACK PAIN WITHOUT SCIATICA: ICD-10-CM

## 2024-07-02 DIAGNOSIS — G44.89 OTHER HEADACHE SYNDROME: ICD-10-CM

## 2024-07-02 DIAGNOSIS — R20.8 ALLODYNIA: ICD-10-CM

## 2024-07-02 DIAGNOSIS — G89.4 CHRONIC PAIN SYNDROME: ICD-10-CM

## 2024-07-02 DIAGNOSIS — R20.2 BILATERAL LEG PARESTHESIA: ICD-10-CM

## 2024-07-02 DIAGNOSIS — G62.9 POLYNEUROPATHY: ICD-10-CM

## 2024-07-02 DIAGNOSIS — M54.50 CHRONIC BILATERAL LOW BACK PAIN WITHOUT SCIATICA: ICD-10-CM

## 2024-07-02 DIAGNOSIS — G89.29 CHRONIC BILATERAL LOW BACK PAIN WITHOUT SCIATICA: Primary | ICD-10-CM

## 2024-07-02 DIAGNOSIS — M79.18 MYALGIA, MULTIPLE SITES: ICD-10-CM

## 2024-07-02 DIAGNOSIS — G43.019 INTRACTABLE MIGRAINE WITHOUT AURA AND WITHOUT STATUS MIGRAINOSUS: ICD-10-CM

## 2024-07-02 PROCEDURE — 99213 OFFICE O/P EST LOW 20 MIN: CPT

## 2024-07-02 RX ORDER — TRAMADOL HYDROCHLORIDE 50 MG/1
50 TABLET ORAL EVERY 6 HOURS PRN
Qty: 120 TABLET | Refills: 0 | Status: SHIPPED | OUTPATIENT
Start: 2024-07-05 | End: 2024-08-04

## 2024-07-02 RX ORDER — GABAPENTIN 400 MG/1
400 CAPSULE ORAL DAILY
Qty: 30 CAPSULE | Refills: 0 | Status: SHIPPED | OUTPATIENT
Start: 2024-07-14 | End: 2024-10-12

## 2024-07-02 RX ORDER — DULOXETIN HYDROCHLORIDE 60 MG/1
60 CAPSULE, DELAYED RELEASE ORAL NIGHTLY
Qty: 30 CAPSULE | Refills: 0 | Status: SHIPPED | OUTPATIENT
Start: 2024-07-02 | End: 2024-08-01

## 2024-07-02 RX ORDER — TRAMADOL HYDROCHLORIDE 300 MG/1
300 TABLET, EXTENDED RELEASE ORAL DAILY
Qty: 30 TABLET | Refills: 0 | Status: SHIPPED | OUTPATIENT
Start: 2024-07-15 | End: 2024-08-14

## 2024-07-02 RX ORDER — IBUPROFEN 800 MG/1
800 TABLET ORAL EVERY 8 HOURS PRN
Qty: 90 TABLET | Refills: 0 | Status: SHIPPED | OUTPATIENT
Start: 2024-07-02

## 2024-07-02 RX ORDER — ATOGEPANT 60 MG/1
TABLET ORAL
COMMUNITY
Start: 2024-06-29 | End: 2024-07-02 | Stop reason: SDUPTHER

## 2024-07-02 RX ORDER — GABAPENTIN 800 MG/1
800 TABLET ORAL 2 TIMES DAILY
Qty: 60 TABLET | Refills: 0 | Status: SHIPPED | OUTPATIENT
Start: 2024-07-14 | End: 2024-10-12

## 2024-07-02 RX ORDER — UBROGEPANT 100 MG/1
TABLET ORAL
Qty: 16 TABLET | Refills: 0 | Status: SHIPPED | OUTPATIENT
Start: 2024-07-02

## 2024-07-02 RX ORDER — ATOGEPANT 60 MG/1
60 TABLET ORAL DAILY
Qty: 30 TABLET | Refills: 0 | Status: SHIPPED | OUTPATIENT
Start: 2024-07-02

## 2024-07-02 ASSESSMENT — PAIN DESCRIPTION - LOCATION
LOCATION: BACK
LOCATION_2: LEG

## 2024-07-02 ASSESSMENT — PAIN DESCRIPTION - PAIN TYPE: TYPE: CHRONIC PAIN

## 2024-07-02 ASSESSMENT — PAIN DESCRIPTION - INTENSITY: RATING_2: 0

## 2024-07-02 ASSESSMENT — PAIN DESCRIPTION - ORIENTATION
ORIENTATION_2: LEFT;RIGHT
ORIENTATION: LOWER

## 2024-07-02 ASSESSMENT — ENCOUNTER SYMPTOMS
BACK PAIN: 1
CONSTIPATION: 0

## 2024-07-02 ASSESSMENT — PAIN SCALES - GENERAL: PAINLEVEL_OUTOF10: 1

## 2024-07-02 NOTE — PROGRESS NOTES
[facility] Physical & Pain Medicine    Office Visit    Patient Name: Nubia Dasilva    MR #: [id number]    Account #:[id number]    : 1970    Age: 53 y.o.    Sex: female    Date: 2024    PCP: Rebecca Silver APRN - CNP    Chief Complaint:   Chief Complaint   Patient presents with    Back Pain     1/10    Leg Pain     Bilateral, 0/10       History of Present Illness:   The patient is a 53 y.o. female who presents to the office for a post procedure follow up.     Patient had Bilateral Cervical & Thoracic Trigger Point Injections under Ultrasound and Bilateral Cervical/Thoracic Trigger Point Injections on 2024. Patient had at least 80-85% relief of pain from procedure(s) for at least 8 weeks. After injection, patient was able to increase activity. Patient had less pain from aggravating factors. Patient was able to decrease pain medication usage. Patient received enough pain relief from injections that the patient would like to repeat the injection(s).     Patient has continued HEP - home exercise program which consists of exercises at least 3 times a week or as tolerated in addition patient is encouraged to stretch twice daily (upon awaking & prior to bed).      Patient is planning to temporarily relocate to [place] with her family related to her spouses employment. Patient is unsure when exactly they will be leaving.     Leg Pain  This is a chronic problem. The current episode started more than 1 year ago. The problem occurs constantly. The problem has been waxing and waning. Associated symptoms include arthralgias, headaches, myalgias and numbness. Pertinent negatives include no neck pain or weakness. Exacerbated by: prolonged sitting or driving.   Back Pain  This is a chronic problem. The current episode started more than 1 year ago. The problem occurs constantly. The problem has been waxing and waning since onset. The pain is present in the sacro-iliac and lumbar spine. The quality of

## 2024-07-02 NOTE — TELEPHONE ENCOUNTER
1. Chronic pain syndrome    2. Chronic bilateral low back pain without sciatica    3. Polyneuropathy    4. Bilateral leg paresthesia    5. Allodynia    6. Intractable migraine without aura and without status migrainosus      Requested Prescriptions     Pending Prescriptions Disp Refills    traMADol (ULTRAM ER) 300 MG extended release tablet 30 tablet 0     Sig: Take 1 tablet by mouth daily for 30 days. Exempt - May fill 07/15/24    traMADol (ULTRAM) 50 MG tablet 120 tablet 0     Sig: Take 1 tablet by mouth every 6 hours as needed for Pain (exempt) for up to 30 days. May fill 06/05/24 Max Daily Amount: 200 mg    gabapentin (NEURONTIN) 800 MG tablet 60 tablet 0     Sig: Take 1 tablet by mouth 2 times daily for 90 days. Exempt may fill 714/2024    gabapentin (NEURONTIN) 400 MG capsule 30 capsule 0     Sig: Take 1 capsule by mouth daily for 90 days. (Exempt) may fill 06/14/24    DULoxetine (CYMBALTA) 60 MG extended release capsule 30 capsule 0     Sig: Take 1 capsule by mouth nightly    ibuprofen (ADVIL;MOTRIN) 800 MG tablet 90 tablet 0     Sig: Take 1 tablet by mouth every 8 hours as needed for Pain (exempt)    UBRELVY 100 MG TABS 16 tablet 0     Sig: TAKE 1 TABLET BY MOUTH EVERY DAY AS NEEDED. MAY REPEAT IN 2 HOURS    QULIPTA 60 MG TABS 30 tablet 0     Sig: Take 60 mg by mouth daily       Continue medication with refill sent at appointment yes; refill sent to medical director at appointment no, see refill encounter dated 7/2/2024    Electronically signed by MICHAEL Mccain CNP on 7/2/2024 at 4:02 PM

## 2024-07-02 NOTE — PROGRESS NOTES
Clinic Documentation      Education Provided:  [x] Review of Sam  [] Agreement Review  [x] PEG Score Calculated [] PHQ Score Calculated [] ORT Score Calculated    [] Compliance Issues Discussed [] Cognitive Behavior Needs [x] Exercise [] Review of Test [] Financial Issues  [x] Tobacco/Alcohol Use Reviewed [x] Teaching [] New Patient [] Picture Obtained    Physician Plan:  [] Outgoing Referral  [] Pharmacy Consult  [] Test Ordered [x] Prescription Ordered/Changed   [] Obtained Test Results / Consult Notes        Complete if patient is withholding blood thinner for procedure     Blood Thinner Patient is currently taking:      [] Plavix (Hold for 7 days)  [] Aspirin (Hold for 5 days)     [] Pletal (Hold for 2 days)  [] Pradaxa (Hold for 3 days)    [] Effient (Hold for 7 days)  [] Xarelto (Hold for 2 days)    [] Eliquis (Hold for 2 days)  [] Brilinta (Hold for 7 days)    [] Coumadin (Hold for 5 days) - (INR needs to be drawn the day prior to procedure- INR < 2.0)    [] Aggrenox (Hold for 7 days)        [] Patient will stop medication on their own.    [] Blood Thinner Form Faxed for approval to hold.   Provider form faxed to:     Assessment Completed by:  Electronically signed by Rosie Khan RN on 7/2/2024 at 4:06 PM

## 2024-07-31 DIAGNOSIS — G43.019 INTRACTABLE MIGRAINE WITHOUT AURA AND WITHOUT STATUS MIGRAINOSUS: ICD-10-CM

## 2024-07-31 DIAGNOSIS — G89.29 CHRONIC BILATERAL LOW BACK PAIN WITHOUT SCIATICA: ICD-10-CM

## 2024-07-31 DIAGNOSIS — R20.8 ALLODYNIA: ICD-10-CM

## 2024-07-31 DIAGNOSIS — G62.9 POLYNEUROPATHY: ICD-10-CM

## 2024-07-31 DIAGNOSIS — R20.2 BILATERAL LEG PARESTHESIA: ICD-10-CM

## 2024-07-31 DIAGNOSIS — G89.4 CHRONIC PAIN SYNDROME: ICD-10-CM

## 2024-07-31 DIAGNOSIS — M54.50 CHRONIC BILATERAL LOW BACK PAIN WITHOUT SCIATICA: ICD-10-CM

## 2024-08-05 RX ORDER — TRAMADOL HYDROCHLORIDE 300 MG/1
300 TABLET, EXTENDED RELEASE ORAL DAILY
Qty: 30 TABLET | Refills: 0 | Status: SHIPPED | OUTPATIENT
Start: 2024-08-14 | End: 2024-09-13

## 2024-08-05 RX ORDER — UBROGEPANT 100 MG/1
TABLET ORAL
Qty: 16 TABLET | Refills: 2 | Status: SHIPPED | OUTPATIENT
Start: 2024-08-05

## 2024-08-05 RX ORDER — ATOGEPANT 60 MG/1
60 TABLET ORAL DAILY
Qty: 30 TABLET | Refills: 2 | Status: SHIPPED | OUTPATIENT
Start: 2024-08-05

## 2024-08-05 RX ORDER — IBUPROFEN 800 MG/1
800 TABLET ORAL EVERY 8 HOURS PRN
Qty: 90 TABLET | Refills: 2 | Status: SHIPPED | OUTPATIENT
Start: 2024-08-05

## 2024-08-05 RX ORDER — DULOXETIN HYDROCHLORIDE 60 MG/1
60 CAPSULE, DELAYED RELEASE ORAL NIGHTLY
Qty: 30 CAPSULE | Refills: 2 | Status: SHIPPED | OUTPATIENT
Start: 2024-08-05 | End: 2024-11-03

## 2024-08-05 RX ORDER — GABAPENTIN 800 MG/1
800 TABLET ORAL 2 TIMES DAILY
Qty: 60 TABLET | Refills: 0 | Status: SHIPPED | OUTPATIENT
Start: 2024-08-05 | End: 2024-11-03

## 2024-08-05 RX ORDER — TRAMADOL HYDROCHLORIDE 50 MG/1
50 TABLET ORAL EVERY 6 HOURS PRN
Qty: 120 TABLET | Refills: 0 | Status: SHIPPED | OUTPATIENT
Start: 2024-08-05 | End: 2024-09-04

## 2024-08-05 RX ORDER — GABAPENTIN 400 MG/1
400 CAPSULE ORAL DAILY
Qty: 30 CAPSULE | Refills: 0 | Status: SHIPPED | OUTPATIENT
Start: 2024-08-05 | End: 2024-11-03

## 2024-08-06 ENCOUNTER — APPOINTMENT (RX ONLY)
Dept: URBAN - METROPOLITAN AREA CLINIC 111 | Facility: CLINIC | Age: 54
Setting detail: DERMATOLOGY
End: 2024-08-06

## 2024-08-06 DIAGNOSIS — L50.8 OTHER URTICARIA: ICD-10-CM | Status: INADEQUATELY CONTROLLED

## 2024-08-06 PROCEDURE — ? PRESCRIPTION MEDICATION MANAGEMENT

## 2024-08-06 PROCEDURE — 99204 OFFICE O/P NEW MOD 45 MIN: CPT

## 2024-08-06 PROCEDURE — ? COUNSELING

## 2024-08-06 ASSESSMENT — LOCATION SIMPLE DESCRIPTION DERM
LOCATION SIMPLE: ABDOMEN
LOCATION SIMPLE: RIGHT UPPER BACK

## 2024-08-06 ASSESSMENT — LOCATION ZONE DERM: LOCATION ZONE: TRUNK

## 2024-08-06 ASSESSMENT — LOCATION DETAILED DESCRIPTION DERM
LOCATION DETAILED: PERIUMBILICAL SKIN
LOCATION DETAILED: RIGHT INFERIOR MEDIAL UPPER BACK

## 2024-08-06 NOTE — PROCEDURE: PRESCRIPTION MEDICATION MANAGEMENT
Detail Level: Zone
Render In Strict Bullet Format?: No
Initiate Treatment: Allegra qd x 6 weeks
Plan: If this doesn’t help and control it, patient should see an allergist
Patient

## 2024-08-22 ENCOUNTER — TELEPHONE (OUTPATIENT)
Dept: PAIN MANAGEMENT | Age: 54
End: 2024-08-22

## 2024-08-28 DIAGNOSIS — R20.8 ALLODYNIA: ICD-10-CM

## 2024-08-28 DIAGNOSIS — G89.29 CHRONIC BILATERAL LOW BACK PAIN WITHOUT SCIATICA: ICD-10-CM

## 2024-08-28 DIAGNOSIS — M54.50 CHRONIC BILATERAL LOW BACK PAIN WITHOUT SCIATICA: ICD-10-CM

## 2024-08-28 DIAGNOSIS — R20.2 BILATERAL LEG PARESTHESIA: ICD-10-CM

## 2024-08-28 DIAGNOSIS — G62.9 POLYNEUROPATHY: ICD-10-CM

## 2024-08-28 DIAGNOSIS — G89.4 CHRONIC PAIN SYNDROME: ICD-10-CM

## 2024-09-05 RX ORDER — TRAMADOL HYDROCHLORIDE 50 MG/1
50 TABLET ORAL EVERY 6 HOURS PRN
Qty: 120 TABLET | Refills: 0 | Status: SHIPPED | OUTPATIENT
Start: 2024-09-05 | End: 2024-10-05

## 2024-09-05 RX ORDER — GABAPENTIN 800 MG/1
800 TABLET ORAL 2 TIMES DAILY
Qty: 60 TABLET | Refills: 0 | Status: SHIPPED | OUTPATIENT
Start: 2024-09-05 | End: 2024-10-05

## 2024-09-05 RX ORDER — TRAMADOL HYDROCHLORIDE 300 MG/1
300 TABLET, EXTENDED RELEASE ORAL DAILY
Qty: 30 TABLET | Refills: 0 | Status: SHIPPED | OUTPATIENT
Start: 2024-09-13 | End: 2024-10-13

## 2024-09-05 RX ORDER — GABAPENTIN 400 MG/1
400 CAPSULE ORAL DAILY
Qty: 30 CAPSULE | Refills: 0 | Status: SHIPPED | OUTPATIENT
Start: 2024-09-05 | End: 2024-12-04

## 2024-09-25 ENCOUNTER — TELEPHONE (OUTPATIENT)
Dept: PAIN MANAGEMENT | Age: 54
End: 2024-09-25

## 2024-09-25 DIAGNOSIS — R20.8 ALLODYNIA: ICD-10-CM

## 2024-09-25 DIAGNOSIS — R20.2 BILATERAL LEG PARESTHESIA: ICD-10-CM

## 2024-09-25 DIAGNOSIS — G89.29 CHRONIC BILATERAL LOW BACK PAIN WITHOUT SCIATICA: ICD-10-CM

## 2024-09-25 DIAGNOSIS — M54.50 CHRONIC BILATERAL LOW BACK PAIN WITHOUT SCIATICA: ICD-10-CM

## 2024-09-25 DIAGNOSIS — G89.4 CHRONIC PAIN SYNDROME: ICD-10-CM

## 2024-09-25 DIAGNOSIS — G62.9 POLYNEUROPATHY: ICD-10-CM

## 2024-09-25 RX ORDER — GABAPENTIN 800 MG/1
800 TABLET ORAL 2 TIMES DAILY
Qty: 60 TABLET | Refills: 0 | Status: SHIPPED | OUTPATIENT
Start: 2024-10-04 | End: 2024-11-03

## 2024-09-25 RX ORDER — GABAPENTIN 400 MG/1
400 CAPSULE ORAL DAILY
Qty: 30 CAPSULE | Refills: 0 | Status: SHIPPED | OUTPATIENT
Start: 2024-10-04 | End: 2024-11-03

## 2024-09-25 RX ORDER — TRAMADOL HYDROCHLORIDE 50 MG/1
50 TABLET ORAL EVERY 6 HOURS PRN
Qty: 120 TABLET | Refills: 0 | Status: SHIPPED | OUTPATIENT
Start: 2024-10-05 | End: 2024-11-04

## 2024-09-25 RX ORDER — TRAMADOL HYDROCHLORIDE 300 MG/1
300 TABLET, EXTENDED RELEASE ORAL DAILY
Qty: 30 TABLET | Refills: 0 | Status: SHIPPED | OUTPATIENT
Start: 2024-10-13 | End: 2024-11-12
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
## 2024-10-16 ENCOUNTER — HOSPITAL ENCOUNTER (OUTPATIENT)
Dept: PAIN MANAGEMENT | Age: 54
Discharge: HOME | End: 2024-10-16
Payer: COMMERCIAL

## 2024-10-16 VITALS
BODY MASS INDEX: 40.51 KG/M2 | SYSTOLIC BLOOD PRESSURE: 121 MMHG | RESPIRATION RATE: 18 BRPM | TEMPERATURE: 96.5 F | OXYGEN SATURATION: 96 % | WEIGHT: 251 LBS | DIASTOLIC BLOOD PRESSURE: 79 MMHG | HEART RATE: 80 BPM

## 2024-10-16 DIAGNOSIS — M62.838 CERVICAL PARASPINAL MUSCLE SPASM: ICD-10-CM

## 2024-10-16 DIAGNOSIS — F11.90 CHRONIC, CONTINUOUS USE OF OPIOIDS: Primary | ICD-10-CM

## 2024-10-16 DIAGNOSIS — R20.2 BILATERAL LEG PARESTHESIA: ICD-10-CM

## 2024-10-16 DIAGNOSIS — G89.4 CHRONIC PAIN SYNDROME: ICD-10-CM

## 2024-10-16 DIAGNOSIS — M79.18 MYALGIA, MULTIPLE SITES: ICD-10-CM

## 2024-10-16 DIAGNOSIS — R20.8 ALLODYNIA: ICD-10-CM

## 2024-10-16 DIAGNOSIS — G44.89 OTHER HEADACHE SYNDROME: ICD-10-CM

## 2024-10-16 DIAGNOSIS — G43.019 INTRACTABLE MIGRAINE WITHOUT AURA AND WITHOUT STATUS MIGRAINOSUS: ICD-10-CM

## 2024-10-16 DIAGNOSIS — M54.50 CHRONIC BILATERAL LOW BACK PAIN WITHOUT SCIATICA: ICD-10-CM

## 2024-10-16 DIAGNOSIS — M53.3 SI (SACROILIAC) JOINT DYSFUNCTION: ICD-10-CM

## 2024-10-16 DIAGNOSIS — G89.29 CHRONIC BILATERAL LOW BACK PAIN WITHOUT SCIATICA: ICD-10-CM

## 2024-10-16 DIAGNOSIS — G62.9 POLYNEUROPATHY: ICD-10-CM

## 2024-10-16 PROCEDURE — 99213 OFFICE O/P EST LOW 20 MIN: CPT

## 2024-10-16 RX ORDER — LEVOCETIRIZINE DIHYDROCHLORIDE 5 MG/1
5 TABLET, FILM COATED ORAL DAILY
COMMUNITY

## 2024-10-16 RX ORDER — GABAPENTIN 800 MG/1
800 TABLET ORAL 2 TIMES DAILY
Qty: 60 TABLET | Refills: 0 | Status: SHIPPED | OUTPATIENT
Start: 2024-10-16 | End: 2024-11-15

## 2024-10-16 RX ORDER — LISINOPRIL 10 MG/1
10 TABLET ORAL DAILY
COMMUNITY

## 2024-10-16 RX ORDER — IBUPROFEN 800 MG/1
800 TABLET, FILM COATED ORAL EVERY 8 HOURS PRN
Qty: 90 TABLET | Refills: 2 | Status: SHIPPED | OUTPATIENT
Start: 2024-10-16

## 2024-10-16 RX ORDER — TRAMADOL HYDROCHLORIDE 50 MG/1
50 TABLET ORAL EVERY 6 HOURS PRN
Qty: 120 TABLET | Refills: 0 | Status: SHIPPED | OUTPATIENT
Start: 2024-11-05 | End: 2024-12-05

## 2024-10-16 RX ORDER — UBROGEPANT 100 MG/1
TABLET ORAL
Qty: 16 TABLET | Refills: 2 | Status: SHIPPED | OUTPATIENT
Start: 2024-10-16

## 2024-10-16 RX ORDER — GABAPENTIN 400 MG/1
400 CAPSULE ORAL DAILY
Qty: 30 CAPSULE | Refills: 0 | Status: SHIPPED | OUTPATIENT
Start: 2024-10-16 | End: 2024-11-15

## 2024-10-16 RX ORDER — TRAMADOL HYDROCHLORIDE 300 MG/1
300 TABLET, EXTENDED RELEASE ORAL DAILY
Qty: 30 TABLET | Refills: 0 | Status: SHIPPED | OUTPATIENT
Start: 2024-11-17 | End: 2024-12-17

## 2024-10-16 RX ORDER — ATOGEPANT 60 MG/1
60 TABLET ORAL DAILY
Qty: 30 TABLET | Refills: 2 | Status: SHIPPED | OUTPATIENT
Start: 2024-10-16

## 2024-10-16 RX ORDER — DULOXETIN HYDROCHLORIDE 60 MG/1
60 CAPSULE, DELAYED RELEASE ORAL NIGHTLY
Qty: 30 CAPSULE | Refills: 2 | Status: SHIPPED | OUTPATIENT
Start: 2024-10-16 | End: 2025-01-14

## 2024-10-16 ASSESSMENT — PAIN DESCRIPTION - DESCRIPTORS: DESCRIPTORS_2: BURNING;SORE;ACHING

## 2024-10-16 ASSESSMENT — PAIN DESCRIPTION - ORIENTATION
ORIENTATION_2: RIGHT;LEFT
ORIENTATION: RIGHT;LEFT;LOWER

## 2024-10-16 ASSESSMENT — ENCOUNTER SYMPTOMS
BACK PAIN: 1
EYE PAIN: 0
SHORTNESS OF BREATH: 0
CHEST TIGHTNESS: 0
COLOR CHANGE: 0
APNEA: 0
ABDOMINAL PAIN: 0

## 2024-10-16 ASSESSMENT — PAIN DESCRIPTION - INTENSITY: RATING_2: 6

## 2024-10-16 ASSESSMENT — PAIN DESCRIPTION - FREQUENCY: FREQUENCY: INTERMITTENT

## 2024-10-16 ASSESSMENT — PAIN - FUNCTIONAL ASSESSMENT
PAIN_FUNCTIONAL_ASSESSMENT_SITE2: PREVENTS OR INTERFERES SOME ACTIVE ACTIVITIES AND ADLS
PAIN_FUNCTIONAL_ASSESSMENT: PREVENTS OR INTERFERES SOME ACTIVE ACTIVITIES AND ADLS

## 2024-10-16 ASSESSMENT — PAIN DESCRIPTION - LOCATION: LOCATION_2: SHOULDER

## 2024-10-16 ASSESSMENT — PAIN DESCRIPTION - PAIN TYPE: TYPE: CHRONIC PAIN

## 2024-10-16 ASSESSMENT — PAIN SCALES - GENERAL: PAINLEVEL_OUTOF10: 8

## 2024-10-16 ASSESSMENT — PAIN DESCRIPTION - ONSET: ONSET: AWAKENED FROM SLEEP

## 2024-10-16 NOTE — PROGRESS NOTES
Clinic Documentation      Education Provided:  [x] Review of Sam  [] Agreement Review  [x] PEG Score Calculated [] PHQ Score Calculated [] ORT Score Calculated    [] Compliance Issues Discussed [] Cognitive Behavior Needs [x] Exercise [] Review of Test [] Financial Issues  [x] Tobacco/Alcohol Use Reviewed [x] Teaching [] New Patient [] Picture Obtained    Physician Plan:  [] Outgoing Referral  [] Pharmacy Consult  [] Test Ordered [x] Prescription Ordered/Changed   [] Obtained Test Results / Consult Notes        Complete if patient is withholding blood thinner for procedure     Blood Thinner Patient is currently taking:      [] Plavix (Hold for 7 days)  [] Aspirin (Hold for 5 days)     [] Pletal (Hold for 2 days)  [] Pradaxa (Hold for 3 days)    [] Effient (Hold for 7 days)  [] Xarelto (Hold for 2 days)    [] Eliquis (Hold for 2 days)  [] Brilinta (Hold for 7 days)    [] Coumadin (Hold for 5 days) - (INR needs to be drawn the day prior to procedure- INR < 2.0)    [] Aggrenox (Hold for 7 days)        [] Patient will stop medication on their own.    [] Blood Thinner Form Faxed for approval to hold.   Provider form faxed to:     Assessment Completed by:  Electronically signed by Anca Zavala RN on 10/16/2024 at 9:19 AM  
NEEDED. MAY REPEAT IN 2 HOURS 16 tablet 2    QULIPTA 60 MG TABS Take 60 mg by mouth daily 30 tablet 2    ibuprofen (ADVIL;MOTRIN) 800 MG tablet Take 1 tablet by mouth every 8 hours as needed for Pain (exempt) 90 tablet 2    DULoxetine (CYMBALTA) 60 MG extended release capsule Take 1 capsule by mouth nightly 30 capsule 2    NONFORMULARY Women's Gut Health      NONFORMULARY berberine      fluticasone (FLONASE) 50 MCG/ACT nasal spray as needed      nicotine (NICODERM CQ) 21 MG/24HR  (Patient not taking: Reported on 1/29/2024)      omeprazole (PRILOSEC) 40 MG delayed release capsule TAKE 1 CAPSULE BY MOUTH TWICE DAILY 30 MINUTES BEFORE MEALS      QUEtiapine (SEROQUEL) 400 MG tablet Take 1 tablet by mouth daily      busPIRone (BUSPAR) 10 MG tablet Take 1 tablet by mouth 2 times daily as needed      Multiple Vitamins-Minerals (THERAPEUTIC MULTIVITAMIN-MINERALS) tablet Take 1 tablet by mouth daily      melatonin 3 MG TABS tablet Take 3 mg by mouth nightly as needed      acetaminophen (TYLENOL) 325 MG tablet Take 650 mg by mouth every 4 hours as needed for Pain      bisacodyl (DULCOLAX) 5 MG EC tablet Take 5 mg by mouth daily as needed for Constipation       No current facility-administered medications for this encounter.       Social History    Social History     Socioeconomic History    Marital status:      Spouse name: richa    Number of children: 2    Years of education: None    Highest education level: None   Tobacco Use    Smoking status: Every Day     Current packs/day: 2.00     Types: Cigarettes    Smokeless tobacco: Never   Vaping Use    Vaping status: Never Used   Substance and Sexual Activity    Alcohol use: No    Drug use: No    Sexual activity: Not Currently     Partners: Male     Social Determinants of Health      Received from [facility]    Family and Community Support    Received from [facility]    Abuse Screen

## 2024-11-26 DIAGNOSIS — M54.50 CHRONIC BILATERAL LOW BACK PAIN WITHOUT SCIATICA: ICD-10-CM

## 2024-11-26 DIAGNOSIS — G62.9 POLYNEUROPATHY: ICD-10-CM

## 2024-11-26 DIAGNOSIS — R20.2 BILATERAL LEG PARESTHESIA: ICD-10-CM

## 2024-11-26 DIAGNOSIS — G89.4 CHRONIC PAIN SYNDROME: ICD-10-CM

## 2024-11-26 DIAGNOSIS — G89.29 CHRONIC BILATERAL LOW BACK PAIN WITHOUT SCIATICA: ICD-10-CM

## 2024-11-26 DIAGNOSIS — R20.8 ALLODYNIA: ICD-10-CM

## 2024-11-26 RX ORDER — GABAPENTIN 400 MG/1
400 CAPSULE ORAL DAILY
Qty: 30 CAPSULE | Refills: 1 | Status: SHIPPED | OUTPATIENT
Start: 2024-11-26 | End: 2025-01-25

## 2024-11-26 RX ORDER — TRAMADOL HYDROCHLORIDE 300 MG/1
300 TABLET, EXTENDED RELEASE ORAL DAILY
Qty: 30 TABLET | Refills: 0 | Status: SHIPPED | OUTPATIENT
Start: 2024-12-15 | End: 2025-01-14

## 2024-11-26 RX ORDER — GABAPENTIN 800 MG/1
800 TABLET ORAL 2 TIMES DAILY
Qty: 60 TABLET | Refills: 1 | Status: SHIPPED | OUTPATIENT
Start: 2024-11-26 | End: 2025-01-25

## 2024-11-26 RX ORDER — TRAMADOL HYDROCHLORIDE 50 MG/1
50 TABLET ORAL EVERY 6 HOURS PRN
Qty: 120 TABLET | Refills: 1 | Status: SHIPPED | OUTPATIENT
Start: 2024-12-04 | End: 2025-02-02

## 2024-11-26 NOTE — TELEPHONE ENCOUNTER
Last seen in office visit: 10/16/24  Next scheduled office visit: 02/24/25  Last UDS results: 01/29/25  Any discrepancies on Sam (such as refills from another provider): no

## 2024-12-03 NOTE — TELEPHONE ENCOUNTER
1. Chronic pain syndrome  - DULoxetine (CYMBALTA) 60 MG extended release capsule; Take 1 capsule by mouth nightly  Dispense: 30 capsule; Refill: 2    2. Polyneuropathy  - DULoxetine (CYMBALTA) 60 MG extended release capsule; Take 1 capsule by mouth nightly  Dispense: 30 capsule; Refill: 2    3. Intractable migraine without aura and without status migrainosus  - DULoxetine (CYMBALTA) 60 MG extended release capsule; Take 1 capsule by mouth nightly  Dispense: 30 capsule;  Refill: 2      Requested Prescriptions     Signed Prescriptions Disp Refills    DULoxetine (CYMBALTA) 60 MG extended release capsule 30 capsule 2     Sig: Take 1 capsule by mouth nightly     Authorizing Provider: Cedrick Wallace       Continue medication with refill sent at appointment yes; refill sent to medical director at appointment NA, see refill encounter dated 7/27/2023    Electronically signed by MICHAEL Falk CNP on 7/27/2023 at 4:02 PM No

## 2024-12-23 DIAGNOSIS — G89.29 CHRONIC BILATERAL LOW BACK PAIN WITHOUT SCIATICA: ICD-10-CM

## 2024-12-23 DIAGNOSIS — G89.4 CHRONIC PAIN SYNDROME: ICD-10-CM

## 2024-12-23 DIAGNOSIS — M54.50 CHRONIC BILATERAL LOW BACK PAIN WITHOUT SCIATICA: ICD-10-CM

## 2024-12-23 RX ORDER — TRAMADOL HYDROCHLORIDE 300 MG/1
300 TABLET, EXTENDED RELEASE ORAL DAILY
Qty: 30 TABLET | Refills: 0 | Status: SHIPPED | OUTPATIENT
Start: 2025-01-14 | End: 2025-02-13

## 2025-01-20 ENCOUNTER — HOSPITAL ENCOUNTER (OUTPATIENT)
Dept: PAIN MANAGEMENT | Age: 55
Discharge: HOME | End: 2025-01-20
Payer: COMMERCIAL

## 2025-01-20 VITALS
BODY MASS INDEX: 35.16 KG/M2 | HEIGHT: 67 IN | OXYGEN SATURATION: 97 % | TEMPERATURE: 97.2 F | SYSTOLIC BLOOD PRESSURE: 127 MMHG | HEART RATE: 88 BPM | RESPIRATION RATE: 16 BRPM | DIASTOLIC BLOOD PRESSURE: 78 MMHG | WEIGHT: 224 LBS

## 2025-01-20 DIAGNOSIS — G62.9 POLYNEUROPATHY: ICD-10-CM

## 2025-01-20 DIAGNOSIS — F11.90 CHRONIC, CONTINUOUS USE OF OPIOIDS: ICD-10-CM

## 2025-01-20 DIAGNOSIS — Z79.891 ENCOUNTER FOR MONITORING OPIOID MAINTENANCE THERAPY: ICD-10-CM

## 2025-01-20 DIAGNOSIS — G89.4 CHRONIC PAIN SYNDROME: Primary | ICD-10-CM

## 2025-01-20 DIAGNOSIS — M79.18 MYOFASCIAL PAIN: ICD-10-CM

## 2025-01-20 DIAGNOSIS — M54.50 CHRONIC BILATERAL LOW BACK PAIN WITHOUT SCIATICA: ICD-10-CM

## 2025-01-20 DIAGNOSIS — R20.8 ALLODYNIA: ICD-10-CM

## 2025-01-20 DIAGNOSIS — F17.210 NICOTINE DEPENDENCE, CIGARETTES, UNCOMPLICATED: ICD-10-CM

## 2025-01-20 DIAGNOSIS — G89.29 CHRONIC BILATERAL LOW BACK PAIN WITHOUT SCIATICA: ICD-10-CM

## 2025-01-20 DIAGNOSIS — M53.3 SI (SACROILIAC) JOINT DYSFUNCTION: ICD-10-CM

## 2025-01-20 DIAGNOSIS — G89.29 CHRONIC BILATERAL LOW BACK PAIN WITH BILATERAL SCIATICA: ICD-10-CM

## 2025-01-20 DIAGNOSIS — M54.42 CHRONIC BILATERAL LOW BACK PAIN WITH BILATERAL SCIATICA: ICD-10-CM

## 2025-01-20 DIAGNOSIS — G89.4 CHRONIC PAIN SYNDROME: ICD-10-CM

## 2025-01-20 DIAGNOSIS — G43.709 CHRONIC MIGRAINE W/O AURA W/O STATUS MIGRAINOSUS, NOT INTRACTABLE: ICD-10-CM

## 2025-01-20 DIAGNOSIS — Z51.81 ENCOUNTER FOR MONITORING OPIOID MAINTENANCE THERAPY: ICD-10-CM

## 2025-01-20 DIAGNOSIS — G43.019 INTRACTABLE MIGRAINE WITHOUT AURA AND WITHOUT STATUS MIGRAINOSUS: ICD-10-CM

## 2025-01-20 DIAGNOSIS — M54.41 CHRONIC BILATERAL LOW BACK PAIN WITH BILATERAL SCIATICA: ICD-10-CM

## 2025-01-20 DIAGNOSIS — J43.8 OTHER EMPHYSEMA (HCC): ICD-10-CM

## 2025-01-20 DIAGNOSIS — R20.2 BILATERAL LEG PARESTHESIA: ICD-10-CM

## 2025-01-20 DIAGNOSIS — Z02.89 PAIN MANAGEMENT CONTRACT SIGNED: ICD-10-CM

## 2025-01-20 PROCEDURE — 99214 OFFICE O/P EST MOD 30 MIN: CPT

## 2025-01-20 PROCEDURE — 99215 OFFICE O/P EST HI 40 MIN: CPT

## 2025-01-20 RX ORDER — ALBUTEROL SULFATE 90 UG/1
INHALANT RESPIRATORY (INHALATION)
COMMUNITY
Start: 2024-12-27

## 2025-01-20 ASSESSMENT — ENCOUNTER SYMPTOMS
GASTROINTESTINAL NEGATIVE: 1
BOWEL INCONTINENCE: 0
EYES NEGATIVE: 1
RESPIRATORY NEGATIVE: 1
BACK PAIN: 1

## 2025-01-20 ASSESSMENT — PAIN DESCRIPTION - LOCATION
LOCATION: BACK
LOCATION_2: NECK

## 2025-01-20 ASSESSMENT — PAIN DESCRIPTION - PAIN TYPE: TYPE: CHRONIC PAIN

## 2025-01-20 ASSESSMENT — PAIN DESCRIPTION - INTENSITY: RATING_2: 8

## 2025-01-20 ASSESSMENT — PAIN DESCRIPTION - ORIENTATION
ORIENTATION: LOWER
ORIENTATION_2: MID;LOWER

## 2025-01-20 ASSESSMENT — PAIN SCALES - GENERAL: PAINLEVEL_OUTOF10: 9

## 2025-01-20 NOTE — PROGRESS NOTES
Clinic Documentation      Education Provided:  [x] Review of Sam  [x] Agreement Review  [x] PEG Score Calculated [x] PHQ Score Calculated [x] ORT Score Calculated    [] Compliance Issues Discussed [] Cognitive Behavior Needs [x] Exercise [] Review of Test [] Financial Issues  [x] Tobacco/Alcohol Use Reviewed [x] Teaching [] New Patient [] Picture Obtained    Physician Plan:  [] Outgoing Referral  [] Pharmacy Consult  [x] Test Ordered [x] Prescription Ordered/Changed   [] Obtained Test Results / Consult Notes        Complete if patient is withholding blood thinner for procedure     Blood Thinner Patient is currently taking:      [] Plavix (Hold for 7 days)  [] Aspirin (Hold for 5 days)     [] Pletal (Hold for 2 days)  [] Pradaxa (Hold for 3 days)    [] Effient (Hold for 7 days)  [] Xarelto (Hold for 2 days)    [] Eliquis (Hold for 2 days)  [] Brilinta (Hold for 7 days)    [] Coumadin (Hold for 5 days) - (INR needs to be drawn the day prior to procedure- INR < 2.0)    [] Aggrenox (Hold for 7 days)        [] Patient will stop medication on their own.    [] Blood Thinner Form Faxed for approval to hold.   Provider form faxed to:     Assessment Completed by:  Electronically signed by Yuko Kaur MA on 1/20/2025 at 8:40 AM

## 2025-01-20 NOTE — H&P
[facility] Physical & Pain Medicine  [address] [address]  Phone: [phone]  Fax: [phone]        Annual History and Physical    Patient Name: Nubia Dasilva    MR #: [id number]    Account #:[id number]    : 1970    Age: 54 y.o.    Sex: female    Date: 25    PCP: Rebecca Silver APRN - CNP    Chief Complaint:   Chief Complaint   Patient presents with    Back Pain     9/10    Neck Pain     8/10       History of Present Illness:   The patient is a 54 y.o. female who presents for annual H&P with primary complaints of chronic pain syndrome, chronic low back pain with bilateral sciatica, myofascial pain and chronic migraine. She is currently maintained on gabapentin, tramadol ER and tramadol. She reports that she has not had her SI joint injections and TP injections since May 2024 due to scheduling and transportation issues. She reports that her pain has increased without these injections and she is needing more breakthrough medication for her pain. She has been an established patient since 2023.     She moves around due to her husbands work and has lived in [place] but currently residing in [place]. Does not know how long they will be down there, plans to return to the area. Fills medications in [place], [place].     Back Pain  This is a chronic problem. The current episode started more than 1 year ago. The problem occurs constantly. The problem has been gradually worsening since onset. The pain is present in the lumbar spine and sacro-iliac. The quality of the pain is described as aching, burning, shooting and stabbing. The pain radiates to the right thigh and left thigh. The pain is at a severity of 9/10. The pain is severe. The pain is The same all the time. The symptoms are aggravated by bending, position, sitting and standing. Stiffness is present In the morning. Associated symptoms include leg pain and pelvic pain. Pertinent negatives include no

## 2025-01-20 NOTE — TELEPHONE ENCOUNTER
There are no diagnoses linked to this encounter.    Requested Prescriptions      No prescriptions requested or ordered in this encounter       Continue medication with refill sent at appointment yes; refill sent to medical director at appointment yes, see refill encounter dated 1/20/2025    Electronically signed by MICHAEL Mdeina CNP on 1/20/2025 at 8:28 AM

## 2025-01-21 RX ORDER — UBROGEPANT 100 MG/1
TABLET ORAL
Qty: 16 TABLET | Refills: 2 | Status: SHIPPED | OUTPATIENT
Start: 2025-01-21

## 2025-01-21 RX ORDER — TRAMADOL HYDROCHLORIDE 50 MG/1
50 TABLET ORAL EVERY 6 HOURS PRN
Qty: 120 TABLET | Refills: 0 | Status: SHIPPED | OUTPATIENT
Start: 2025-03-02 | End: 2025-05-01

## 2025-01-21 RX ORDER — DULOXETIN HYDROCHLORIDE 60 MG/1
60 CAPSULE, DELAYED RELEASE ORAL NIGHTLY
Qty: 30 CAPSULE | Refills: 2 | Status: SHIPPED | OUTPATIENT
Start: 2025-01-21 | End: 2025-04-21

## 2025-01-21 RX ORDER — GABAPENTIN 400 MG/1
400 CAPSULE ORAL DAILY
Qty: 30 CAPSULE | Refills: 2 | Status: SHIPPED | OUTPATIENT
Start: 2025-01-24 | End: 2025-04-24

## 2025-01-21 RX ORDER — ATOGEPANT 60 MG/1
60 TABLET ORAL DAILY
Qty: 30 TABLET | Refills: 2 | Status: SHIPPED | OUTPATIENT
Start: 2025-01-21

## 2025-01-21 RX ORDER — GABAPENTIN 800 MG/1
800 TABLET ORAL 2 TIMES DAILY
Qty: 60 TABLET | Refills: 2 | Status: SHIPPED | OUTPATIENT
Start: 2025-01-24 | End: 2025-04-24

## 2025-01-21 RX ORDER — IBUPROFEN 800 MG/1
800 TABLET, FILM COATED ORAL EVERY 8 HOURS PRN
Qty: 90 TABLET | Refills: 2 | Status: SHIPPED | OUTPATIENT
Start: 2025-01-21

## 2025-01-21 RX ORDER — TRAMADOL HYDROCHLORIDE 300 MG/1
300 TABLET, EXTENDED RELEASE ORAL DAILY
Qty: 30 TABLET | Refills: 0 | Status: SHIPPED | OUTPATIENT
Start: 2025-02-13 | End: 2025-03-15

## 2025-02-25 DIAGNOSIS — G89.29 CHRONIC BILATERAL LOW BACK PAIN WITHOUT SCIATICA: ICD-10-CM

## 2025-02-25 DIAGNOSIS — G62.9 POLYNEUROPATHY: ICD-10-CM

## 2025-02-25 DIAGNOSIS — M54.50 CHRONIC BILATERAL LOW BACK PAIN WITHOUT SCIATICA: ICD-10-CM

## 2025-02-25 DIAGNOSIS — R20.2 BILATERAL LEG PARESTHESIA: ICD-10-CM

## 2025-02-25 DIAGNOSIS — G89.4 CHRONIC PAIN SYNDROME: ICD-10-CM

## 2025-02-25 RX ORDER — TRAMADOL HYDROCHLORIDE 50 MG/1
50 TABLET ORAL EVERY 6 HOURS PRN
Qty: 120 TABLET | Refills: 0 | Status: SHIPPED | OUTPATIENT
Start: 2025-03-02 | End: 2025-05-01

## 2025-02-25 RX ORDER — TRAMADOL HYDROCHLORIDE 300 MG/1
300 TABLET, EXTENDED RELEASE ORAL DAILY
Qty: 30 TABLET | Refills: 0 | Status: SHIPPED | OUTPATIENT
Start: 2025-03-15 | End: 2025-04-14

## 2025-02-25 NOTE — TELEPHONE ENCOUNTER
Patient called for refills on 2/24/25 at 12:04.  Patient has an appointment on 4/21/25 with Velvet.

## 2025-03-24 DIAGNOSIS — R20.2 BILATERAL LEG PARESTHESIA: ICD-10-CM

## 2025-03-24 DIAGNOSIS — G89.4 CHRONIC PAIN SYNDROME: ICD-10-CM

## 2025-03-24 DIAGNOSIS — G62.9 POLYNEUROPATHY: ICD-10-CM

## 2025-03-24 DIAGNOSIS — G89.29 CHRONIC BILATERAL LOW BACK PAIN WITHOUT SCIATICA: ICD-10-CM

## 2025-03-24 DIAGNOSIS — M54.50 CHRONIC BILATERAL LOW BACK PAIN WITHOUT SCIATICA: ICD-10-CM

## 2025-03-25 RX ORDER — TRAMADOL HYDROCHLORIDE 50 MG/1
50 TABLET ORAL EVERY 6 HOURS PRN
Qty: 120 TABLET | Refills: 0 | Status: SHIPPED | OUTPATIENT
Start: 2025-04-01 | End: 2025-05-01

## 2025-04-09 DIAGNOSIS — R20.8 ALLODYNIA: ICD-10-CM

## 2025-04-09 DIAGNOSIS — G62.9 POLYNEUROPATHY: ICD-10-CM

## 2025-04-09 DIAGNOSIS — G89.29 CHRONIC BILATERAL LOW BACK PAIN WITHOUT SCIATICA: ICD-10-CM

## 2025-04-09 DIAGNOSIS — G89.4 CHRONIC PAIN SYNDROME: ICD-10-CM

## 2025-04-09 DIAGNOSIS — R20.2 BILATERAL LEG PARESTHESIA: ICD-10-CM

## 2025-04-09 DIAGNOSIS — G43.019 INTRACTABLE MIGRAINE WITHOUT AURA AND WITHOUT STATUS MIGRAINOSUS: ICD-10-CM

## 2025-04-09 DIAGNOSIS — M54.50 CHRONIC BILATERAL LOW BACK PAIN WITHOUT SCIATICA: ICD-10-CM

## 2025-04-10 RX ORDER — ATOGEPANT 60 MG/1
60 TABLET ORAL DAILY
Qty: 30 TABLET | Refills: 2 | Status: SHIPPED | OUTPATIENT
Start: 2025-04-10

## 2025-04-10 RX ORDER — GABAPENTIN 800 MG/1
800 TABLET ORAL 2 TIMES DAILY
Qty: 60 TABLET | Refills: 2 | Status: SHIPPED | OUTPATIENT
Start: 2025-04-24 | End: 2025-07-23

## 2025-04-10 RX ORDER — TRAMADOL HYDROCHLORIDE 300 MG/1
300 TABLET, EXTENDED RELEASE ORAL DAILY
Qty: 30 TABLET | Refills: 0 | Status: SHIPPED | OUTPATIENT
Start: 2025-04-19 | End: 2025-05-19

## 2025-04-10 RX ORDER — UBROGEPANT 100 MG/1
TABLET ORAL
Qty: 16 TABLET | Refills: 2 | Status: SHIPPED | OUTPATIENT
Start: 2025-04-10

## 2025-04-10 RX ORDER — IBUPROFEN 800 MG/1
800 TABLET, FILM COATED ORAL EVERY 8 HOURS PRN
Qty: 90 TABLET | Refills: 2 | Status: SHIPPED | OUTPATIENT
Start: 2025-04-10

## 2025-04-10 RX ORDER — GABAPENTIN 400 MG/1
400 CAPSULE ORAL DAILY
Qty: 30 CAPSULE | Refills: 2 | Status: SHIPPED | OUTPATIENT
Start: 2025-04-24 | End: 2025-07-23

## 2025-04-10 RX ORDER — DULOXETIN HYDROCHLORIDE 60 MG/1
60 CAPSULE, DELAYED RELEASE ORAL NIGHTLY
Qty: 30 CAPSULE | Refills: 2 | Status: SHIPPED | OUTPATIENT
Start: 2025-04-10 | End: 2025-07-09

## 2025-04-21 ENCOUNTER — OFFICE VISIT (OUTPATIENT)
Dept: PAIN MANAGEMENT | Age: 55
End: 2025-04-21
Payer: COMMERCIAL

## 2025-04-21 VITALS
SYSTOLIC BLOOD PRESSURE: 99 MMHG | RESPIRATION RATE: 16 BRPM | TEMPERATURE: 96.2 F | HEIGHT: 67 IN | BODY MASS INDEX: 31.08 KG/M2 | WEIGHT: 198 LBS | DIASTOLIC BLOOD PRESSURE: 68 MMHG | OXYGEN SATURATION: 97 % | HEART RATE: 90 BPM

## 2025-04-21 DIAGNOSIS — M54.42 CHRONIC BILATERAL LOW BACK PAIN WITH BILATERAL SCIATICA: ICD-10-CM

## 2025-04-21 DIAGNOSIS — M62.838 CERVICAL PARASPINAL MUSCLE SPASM: ICD-10-CM

## 2025-04-21 DIAGNOSIS — G62.9 POLYNEUROPATHY: ICD-10-CM

## 2025-04-21 DIAGNOSIS — G89.4 CHRONIC PAIN SYNDROME: Primary | ICD-10-CM

## 2025-04-21 DIAGNOSIS — M53.3 SI (SACROILIAC) JOINT DYSFUNCTION: ICD-10-CM

## 2025-04-21 DIAGNOSIS — M62.830 SPASM OF THORACIC BACK MUSCLE: ICD-10-CM

## 2025-04-21 DIAGNOSIS — F11.90 CHRONIC, CONTINUOUS USE OF OPIOIDS: ICD-10-CM

## 2025-04-21 DIAGNOSIS — G89.29 CHRONIC BILATERAL LOW BACK PAIN WITH BILATERAL SCIATICA: ICD-10-CM

## 2025-04-21 DIAGNOSIS — G43.709 CHRONIC MIGRAINE W/O AURA W/O STATUS MIGRAINOSUS, NOT INTRACTABLE: ICD-10-CM

## 2025-04-21 DIAGNOSIS — F17.210 NICOTINE DEPENDENCE, CIGARETTES, UNCOMPLICATED: ICD-10-CM

## 2025-04-21 DIAGNOSIS — J43.8 OTHER EMPHYSEMA (HCC): ICD-10-CM

## 2025-04-21 DIAGNOSIS — M79.18 MYALGIA, MULTIPLE SITES: ICD-10-CM

## 2025-04-21 DIAGNOSIS — M54.41 CHRONIC BILATERAL LOW BACK PAIN WITH BILATERAL SCIATICA: ICD-10-CM

## 2025-04-21 PROCEDURE — 99214 OFFICE O/P EST MOD 30 MIN: CPT

## 2025-04-21 RX ORDER — TRAMADOL HYDROCHLORIDE 50 MG/1
50 TABLET ORAL EVERY 6 HOURS PRN
Qty: 120 TABLET | Refills: 0 | Status: SHIPPED | OUTPATIENT
Start: 2025-05-19 | End: 2025-04-21

## 2025-04-21 RX ORDER — TRAMADOL HYDROCHLORIDE 300 MG/1
300 TABLET, EXTENDED RELEASE ORAL DAILY
Qty: 30 TABLET | Refills: 0 | Status: SHIPPED | OUTPATIENT
Start: 2025-05-19 | End: 2025-06-18

## 2025-04-21 RX ORDER — TRIAMCINOLONE ACETONIDE 5 MG/G
CREAM TOPICAL
COMMUNITY
Start: 2024-06-20

## 2025-04-21 RX ORDER — ATOGEPANT 60 MG/1
60 TABLET ORAL DAILY
Qty: 30 TABLET | Refills: 2 | Status: SHIPPED | OUTPATIENT
Start: 2025-04-21

## 2025-04-21 RX ORDER — TRAMADOL HYDROCHLORIDE 50 MG/1
50 TABLET ORAL EVERY 6 HOURS PRN
Qty: 120 TABLET | Refills: 0 | Status: SHIPPED | OUTPATIENT
Start: 2025-05-01 | End: 2025-05-31

## 2025-04-21 RX ORDER — UBROGEPANT 100 MG/1
TABLET ORAL
Qty: 16 TABLET | Refills: 2 | Status: SHIPPED | OUTPATIENT
Start: 2025-04-21

## 2025-04-21 RX ORDER — LIDOCAINE 4 G/G
1 PATCH TOPICAL DAILY
Qty: 30 PATCH | Refills: 2 | Status: SHIPPED | OUTPATIENT
Start: 2025-04-21 | End: 2025-07-20

## 2025-04-21 ASSESSMENT — ENCOUNTER SYMPTOMS
PHOTOPHOBIA: 0
GASTROINTESTINAL NEGATIVE: 1
RESPIRATORY NEGATIVE: 1
EYES NEGATIVE: 1
TROUBLE SWALLOWING: 0
BOWEL INCONTINENCE: 0
BACK PAIN: 1
VISUAL CHANGE: 0

## 2025-04-21 NOTE — ASSESSMENT & PLAN NOTE
- continue tramadol as needed. Take tramadol ER daily.  - take lowest dose possible  - take with food to avoid stomach upset  - increase water intake and consider stool softener to prevent constipation    - lidocaine patches as directed.

## 2025-04-21 NOTE — PROGRESS NOTES
[facility]  [address] [address]  Phone: [phone]  Fax: [phone]        Follow Up Office Visit    Patient Name: Nubia Dasilva    MR #: [id number]    Account #:    : 1970    Age: 54 y.o.    Sex: female    Date: 2025     PCP: Rebecca Silver APRN - CNP    Chief Complaint:   Chief Complaint   Patient presents with    Neck Pain     8/10    Back Pain     9/10       History of Present Illness:   The patient is a 54 y.o. female who presents for follow up on primary complaints of chronic neck and back pain, as well as chronic migraine. Patient has been an established patient since 2023. She splits her time between [facility] and [place] [place]. Most prescriptions are filled in [place].     She has an upcoming appointment for cervical trigger points, thoracic trigger point injections and bilateral SI joint injections. She has had all of these injections previously with 80-85% relief after injections for at least 8 weeks.     Since last OV she was admitted for ACS r/o and hypotension to [facility] 25-25. She was released without any changes in medication - except decrease in lisinopril.     Patient is currently managed on cymbalta, tramadol ER, tramadol, Qulipta, gabapentin and ubrelvy. Patient takes opioids on a schedule and uses 4 immediate release per day. With pain medication patient is able to tolerate activities longer than without and is now able to _. Patient denies any side effects from their opioid therapy. Patient reports this therapy is/is not effective.    Back Pain  This is a chronic problem. The current episode started more than 1 year ago. The problem occurs constantly. The problem is unchanged. The pain is present in the lumbar spine and sacro-iliac. The quality of the pain is described as aching, burning, shooting and stabbing. The pain radiates to the right thigh and left thigh. The pain is at a severity of 9/10.

## 2025-04-21 NOTE — ASSESSMENT & PLAN NOTE
- managed by PCP   - increases risk of respiratory depression or respiratory failure  - will ensure patient has narcan available

## 2025-04-21 NOTE — ASSESSMENT & PLAN NOTE
- [place] reviewed, drug screens reviewed, visits appropriate. Pain contract up to date.     Will continue to strive to explore alternatives to chronic opioid use in this patient with non-malignant pain. Counseling provided in regards to the risks of opioid medications including risk of tolerance, withdrawals and addiction. Furthermore, these medications are known to cause immunosuppression, respiratory depression and even early death.     The CDC guideline is aimed at improving opioid prescribing practices to ensure safe and effective chronic pain treatment while reducing the risk of opioid use disorder (OUD), overdose, and death. Although the CDC advises against high doses of opioid pain medication therapy, it may be medically risky to abruptly discontinue opioids and other controlled substances in a patient who is physically dependent without psychosocial management, focused care, gradual taper, and/or adjuvant therapies to treat withdrawal symptoms. Patients must also be willing to taper and pursue alternative medications and treatments.     This patient is high risk on opioid medications. Patient has been on for some time, does not wish to wean and is aware of the risks. Due to risk of suicidal ideation and deterioration of condition if forced to taper, will continue medication at current dose and continue to offer alternative methods of pain control.

## 2025-04-21 NOTE — ASSESSMENT & PLAN NOTE
- Schedule patient for Trigger Point Injections of bilateral thoracic with lidocaine with MICHAEL Pedroza

## 2025-04-21 NOTE — ASSESSMENT & PLAN NOTE
- Schedule patient for Trigger Point Injections of bilateral cervical with lidocaine with MICHAEL Pedroza

## 2025-04-21 NOTE — ASSESSMENT & PLAN NOTE
- continue tramadol as needed. Take tramadol ER daily.  - take lowest dose possible  - take with food to avoid stomach upset  - increase water intake and consider stool softener to prevent constipation

## 2025-04-21 NOTE — ASSESSMENT & PLAN NOTE
- Schedule patient for US guided joint injection of bilateral SI joint with kenalog with MICHAEL Pedroza

## 2025-04-24 ENCOUNTER — HOSPITAL ENCOUNTER (OUTPATIENT)
Dept: PAIN MANAGEMENT | Age: 55
Discharge: HOME | End: 2025-04-24
Payer: COMMERCIAL

## 2025-04-24 VITALS
SYSTOLIC BLOOD PRESSURE: 109 MMHG | DIASTOLIC BLOOD PRESSURE: 79 MMHG | TEMPERATURE: 96.5 F | HEART RATE: 91 BPM | RESPIRATION RATE: 16 BRPM | OXYGEN SATURATION: 98 %

## 2025-04-24 DIAGNOSIS — M79.18 MYALGIA, MULTIPLE SITES: ICD-10-CM

## 2025-04-24 DIAGNOSIS — M62.838 CERVICAL PARASPINAL MUSCLE SPASM: Primary | ICD-10-CM

## 2025-04-24 DIAGNOSIS — M53.3 SI (SACROILIAC) JOINT DYSFUNCTION: ICD-10-CM

## 2025-04-24 DIAGNOSIS — M79.18 MYOFASCIAL PAIN: ICD-10-CM

## 2025-04-24 DIAGNOSIS — M62.830 SPASM OF THORACIC BACK MUSCLE: ICD-10-CM

## 2025-04-24 PROCEDURE — 76942 ECHO GUIDE FOR BIOPSY: CPT

## 2025-04-24 PROCEDURE — 20553 NJX 1/MLT TRIGGER POINTS 3/>: CPT

## 2025-04-24 PROCEDURE — 6360000002 HC RX W HCPCS

## 2025-04-24 PROCEDURE — 20611 DRAIN/INJ JOINT/BURSA W/US: CPT

## 2025-04-24 RX ORDER — BUPIVACAINE HYDROCHLORIDE 5 MG/ML
15 INJECTION, SOLUTION EPIDURAL; INTRACAUDAL; PERINEURAL ONCE
Status: DISCONTINUED | OUTPATIENT
Start: 2025-04-24 | End: 2025-04-26 | Stop reason: HOSPADM

## 2025-04-24 RX ORDER — LIDOCAINE HYDROCHLORIDE 10 MG/ML
15 INJECTION, SOLUTION EPIDURAL; INFILTRATION; INTRACAUDAL; PERINEURAL ONCE
Status: DISCONTINUED | OUTPATIENT
Start: 2025-04-24 | End: 2025-04-26 | Stop reason: HOSPADM

## 2025-04-24 RX ORDER — TRIAMCINOLONE ACETONIDE 40 MG/ML
80 INJECTION, SUSPENSION INTRA-ARTICULAR; INTRAMUSCULAR ONCE
Status: DISCONTINUED | OUTPATIENT
Start: 2025-04-24 | End: 2025-04-26 | Stop reason: HOSPADM

## 2025-04-24 NOTE — PROCEDURES
PROCEDURE NOTE  Date: 2025   Name: Nubia Dasilva  YOB: 1970    Procedures    [facility]    Patient Name: Nubia Dasilva    : 1970    Age: 54 y.o.    Sex: female    Date: 2025    Pre-op Diagnosis: bilateral  myalgia    Post-op Diagnosis: bilateral  myalgia    Procedure: Ultrasound Guided Injection of  bilateral Sacroiliac Joint(s)     Performing Procedure:  Kelly Gallegos CNP    Patient Vitals for the past 24 hrs:   BP Temp Temp src Pulse Resp SpO2   25 0741 109/79 (!) 96.5 °F (35.8 °C) Temporal 91 16 98 %       bilateral  Sacroiliac Joint(s)     Description of Procedure:    After voluntary, informed and signed consent obtained the patient was placed in a prone position. Appropriate time out was obtained per policy. The Sacroiliac Joint(s) was palpated for area of maximal tenderness. The area was prepped in an aseptic fashion with CHG swab. The ultrasound transducer was used to confirm the appropriate location. The skin was sprayed with Gebauer's Solution. Under aseptic technique and direct ultrasound visualization a 22 gauge 3 inch spinal needle was introduced into the Sacroiliac Joint(s). Patient was asked if any new pain was radiating down the ipsilateral lower extremity. If patient noted radiating pain the needle was readjusted until radiating pain was gone. After a negative aspiration, a solution of 1 ml of 0.5% Marcaine Plain and 1 ml of 1% Lidocaine Plain    with      [x] 1 ml of Kenalog (40mg/ml)    [] 0.5 ml of DepoMedrol (80mg/ml)    [] 1 ml of DepoMedrol (80mg/ml)     [] 1 ml of Toradol (30mg/ml)     was injected into the Sacroiliac Joint(s). The needle was withdrawn and a sterile dressing applied. If this was a bilateral procedure, the same steps were followed on the opposite side.         [facility]    Patient Name: Nubia Dasilva    : 1970                    Age:

## 2025-04-24 NOTE — DISCHARGE INSTRUCTIONS
will be numb for a few hours after the procedure    [] I understand if a steroid was used it will take effect in 3 - 7 days. I understand that as the numbing medication wears off, the pain may return until the steroids start working.    [x] I understand that today I will rest for the next 24 hours and drink plenty of water.    [] For Botox for Migraines please do not wear anything constricting around the forehead for 7-10 days post injection. Examples headband, hats, or bandana    [] For Botox for Spasticity start therapy for the affected limb in two weeks.    [] Additional instructions: ______________________________________________ ___________________________________________________________________    Sedation:  Was oral sedation given?    [] Yes  [x] No    I understand that if I took an oral nerve calming medication I will not drive for  [] 24 hours after taking the medication.    [x] I have received a copy of my discharge instructions and understand the above instructions to the best of my knowledge    Patient Discharged:  [x] Home  [] Hospital  [] Other  ____________________________________________    Via:  [x] Ambulatory  [] Wheelchair   [] Stretcher [] EMS       Accompanied By:   [] Significant other  [] Family Member  [] Friend   [] Hospital Staff  []  Ambulance Staff  [x] Other_______________________________________________    Plan:  [] Will return to the office in   [] 1 month   [] 3 months for:  [x] Procedure Follow-up  [] Office Visit   [x] Planned Procedure        [] Printed AVS and given to patient.  [x] Patient has mychart and does not wish for AVS to be printed.      If you have questions, problems or concerns you may call [phone] and follow the prompts

## 2025-05-01 ENCOUNTER — TELEPHONE (OUTPATIENT)
Dept: PAIN MANAGEMENT | Age: 55
End: 2025-05-01

## 2025-05-01 NOTE — TELEPHONE ENCOUNTER
How much did the shot help?   100 in the top ones but the lower ones didn't help as much as the last ones and moving a certain way makes it hurt more but it helped 70%   For [place]-    Duration of shot?             (Goal is 6-8 hours First Injection, 2 hours 2nd injection)  What is your current pain level now? 2                        Has your activity level increased since the shot?  Able to get up and ana around more

## 2025-05-27 DIAGNOSIS — G62.9 POLYNEUROPATHY: ICD-10-CM

## 2025-05-27 DIAGNOSIS — G89.4 CHRONIC PAIN SYNDROME: ICD-10-CM

## 2025-05-27 DIAGNOSIS — M79.18 MYOFASCIAL PAIN: Primary | ICD-10-CM

## 2025-05-27 NOTE — TELEPHONE ENCOUNTER
Last seen in office visit: 4/21/25  Next scheduled office visit: 7/21/25 with Velvet  Last UDS results: compliant  Any discrepancies on Sam (such as refills from another provider): none

## 2025-05-28 RX ORDER — TRAMADOL HYDROCHLORIDE 300 MG/1
300 TABLET, EXTENDED RELEASE ORAL DAILY
Qty: 30 TABLET | Refills: 0 | Status: SHIPPED | OUTPATIENT
Start: 2025-06-18 | End: 2025-07-18

## 2025-05-28 RX ORDER — LIDOCAINE 50 MG/G
1 PATCH TOPICAL DAILY
Qty: 30 PATCH | Refills: 2 | Status: SHIPPED | OUTPATIENT
Start: 2025-05-28 | End: 2025-08-26

## 2025-05-28 RX ORDER — TRAMADOL HYDROCHLORIDE 50 MG/1
50 TABLET ORAL EVERY 6 HOURS PRN
Qty: 120 TABLET | Refills: 0 | Status: SHIPPED | OUTPATIENT
Start: 2025-06-01 | End: 2025-07-01

## 2025-06-24 DIAGNOSIS — G43.019 INTRACTABLE MIGRAINE WITHOUT AURA AND WITHOUT STATUS MIGRAINOSUS: ICD-10-CM

## 2025-06-24 DIAGNOSIS — G62.9 POLYNEUROPATHY: ICD-10-CM

## 2025-06-24 DIAGNOSIS — G89.4 CHRONIC PAIN SYNDROME: ICD-10-CM

## 2025-06-24 RX ORDER — TRAMADOL HYDROCHLORIDE 300 MG/1
300 TABLET, EXTENDED RELEASE ORAL DAILY
Qty: 30 TABLET | Refills: 0 | Status: SHIPPED | OUTPATIENT
Start: 2025-07-18 | End: 2025-08-17

## 2025-06-24 RX ORDER — DULOXETIN HYDROCHLORIDE 60 MG/1
60 CAPSULE, DELAYED RELEASE ORAL NIGHTLY
Qty: 30 CAPSULE | Refills: 2 | Status: SHIPPED | OUTPATIENT
Start: 2025-07-09 | End: 2025-10-07

## 2025-06-24 RX ORDER — TRAMADOL HYDROCHLORIDE 50 MG/1
50 TABLET ORAL EVERY 6 HOURS PRN
Qty: 120 TABLET | Refills: 0 | Status: SHIPPED | OUTPATIENT
Start: 2025-07-01 | End: 2025-07-31

## 2025-07-17 DIAGNOSIS — R20.8 ALLODYNIA: ICD-10-CM

## 2025-07-17 DIAGNOSIS — R20.2 BILATERAL LEG PARESTHESIA: ICD-10-CM

## 2025-07-17 DIAGNOSIS — G62.9 POLYNEUROPATHY: ICD-10-CM

## 2025-07-17 DIAGNOSIS — G89.4 CHRONIC PAIN SYNDROME: ICD-10-CM

## 2025-07-17 RX ORDER — GABAPENTIN 400 MG/1
400 CAPSULE ORAL DAILY
Qty: 30 CAPSULE | Refills: 2 | Status: SHIPPED | OUTPATIENT
Start: 2025-07-23 | End: 2025-10-21

## 2025-07-17 RX ORDER — GABAPENTIN 800 MG/1
800 TABLET ORAL 2 TIMES DAILY
Qty: 60 TABLET | Refills: 2 | Status: SHIPPED | OUTPATIENT
Start: 2025-07-23 | End: 2025-10-21

## 2025-07-21 ENCOUNTER — OFFICE VISIT (OUTPATIENT)
Dept: PAIN MANAGEMENT | Age: 55
End: 2025-07-21
Payer: COMMERCIAL

## 2025-07-21 VITALS
OXYGEN SATURATION: 95 % | WEIGHT: 177 LBS | HEART RATE: 89 BPM | DIASTOLIC BLOOD PRESSURE: 76 MMHG | HEIGHT: 67 IN | TEMPERATURE: 95.7 F | SYSTOLIC BLOOD PRESSURE: 109 MMHG | BODY MASS INDEX: 27.78 KG/M2 | RESPIRATION RATE: 16 BRPM

## 2025-07-21 DIAGNOSIS — M62.838 CERVICAL PARASPINAL MUSCLE SPASM: ICD-10-CM

## 2025-07-21 DIAGNOSIS — M54.41 CHRONIC BILATERAL LOW BACK PAIN WITH BILATERAL SCIATICA: ICD-10-CM

## 2025-07-21 DIAGNOSIS — Z51.81 ENCOUNTER FOR MONITORING OPIOID MAINTENANCE THERAPY: ICD-10-CM

## 2025-07-21 DIAGNOSIS — F11.90 CHRONIC, CONTINUOUS USE OF OPIOIDS: ICD-10-CM

## 2025-07-21 DIAGNOSIS — G89.4 CHRONIC PAIN SYNDROME: Primary | ICD-10-CM

## 2025-07-21 DIAGNOSIS — J43.8 OTHER EMPHYSEMA (HCC): ICD-10-CM

## 2025-07-21 DIAGNOSIS — F17.210 NICOTINE DEPENDENCE, CIGARETTES, UNCOMPLICATED: ICD-10-CM

## 2025-07-21 DIAGNOSIS — G62.9 POLYNEUROPATHY: ICD-10-CM

## 2025-07-21 DIAGNOSIS — M53.3 SI (SACROILIAC) JOINT DYSFUNCTION: ICD-10-CM

## 2025-07-21 DIAGNOSIS — Z79.891 ENCOUNTER FOR MONITORING OPIOID MAINTENANCE THERAPY: ICD-10-CM

## 2025-07-21 DIAGNOSIS — G89.29 CHRONIC BILATERAL LOW BACK PAIN WITH BILATERAL SCIATICA: ICD-10-CM

## 2025-07-21 DIAGNOSIS — M54.42 CHRONIC BILATERAL LOW BACK PAIN WITH BILATERAL SCIATICA: ICD-10-CM

## 2025-07-21 DIAGNOSIS — G43.709 CHRONIC MIGRAINE W/O AURA W/O STATUS MIGRAINOSUS, NOT INTRACTABLE: ICD-10-CM

## 2025-07-21 PROCEDURE — 99214 OFFICE O/P EST MOD 30 MIN: CPT

## 2025-07-21 RX ORDER — ATOGEPANT 60 MG/1
60 TABLET ORAL DAILY
Qty: 30 TABLET | Refills: 2 | Status: SHIPPED | OUTPATIENT
Start: 2025-07-21

## 2025-07-21 RX ORDER — TRAMADOL HYDROCHLORIDE 300 MG/1
300 TABLET, EXTENDED RELEASE ORAL DAILY
Qty: 30 TABLET | Refills: 0 | Status: SHIPPED | OUTPATIENT
Start: 2025-08-18 | End: 2025-09-17

## 2025-07-21 RX ORDER — TRAMADOL HYDROCHLORIDE 50 MG/1
50 TABLET ORAL EVERY 6 HOURS PRN
Qty: 120 TABLET | Refills: 0 | Status: SHIPPED | OUTPATIENT
Start: 2025-07-31 | End: 2025-08-30

## 2025-07-21 RX ORDER — UBROGEPANT 100 MG/1
TABLET ORAL
Qty: 16 TABLET | Refills: 2 | Status: SHIPPED | OUTPATIENT
Start: 2025-07-21

## 2025-07-21 RX ORDER — SEMAGLUTIDE 1.7 MG/.75ML
INJECTION, SOLUTION SUBCUTANEOUS
COMMUNITY
Start: 2025-07-17

## 2025-07-21 RX ORDER — LIDOCAINE 50 MG/G
1 PATCH TOPICAL DAILY
Qty: 30 PATCH | Refills: 2 | Status: SHIPPED | OUTPATIENT
Start: 2025-07-21 | End: 2025-10-19

## 2025-07-21 ASSESSMENT — ENCOUNTER SYMPTOMS
EYES NEGATIVE: 1
GASTROINTESTINAL NEGATIVE: 1
RESPIRATORY NEGATIVE: 1
BACK PAIN: 1
TROUBLE SWALLOWING: 0
PHOTOPHOBIA: 0
VISUAL CHANGE: 0

## 2025-07-21 NOTE — PROGRESS NOTES
[facility] Physical [facility] Pain Medicine  [address] [address]  Phone: [phone]  Fax: [phone]        Follow Up Office Visit    Patient Name: Nubia Dasilva    MR #: [id number]    Account #:    : 1970    Age: 55 y.o.    Sex: female    Date: 2025     PCP: Rebecca Silver APRN - CNP    Chief Complaint:   Chief Complaint   Patient presents with    Back Pain     6/10    Neck Pain     8/10       History of Present Illness:   The patient is a 55 y.o. female who presents for follow up on primary complaints of chronic pain syndrome, chronic migraine and chronic neck and back pain. Patient has been an established patient since . Of note, she splits her time between [facility] and [facility] and most prescriptions are filled in [place].    Patient had bilateral SI joint injections and bilateral cervical and thoracic trigger point injections on 25. Patient had at least 80-85% relief of pain from procedure(s) for at least 8 weeks. After injection, patient was able to increase activity. Patient had less pain from aggravating factors.     Patient is currently managed on cymbalta, tramadol ER, tramadol, Qulipta, gabapentin and ubrelvy. Patient takes opioids on a schedule, with ER version daily and IR version 4 times daily. With pain medication patient is able to tolerate activities longer than without and is now able to travel and enjoy spending time in [place]. Patient denies any side effects from their opioid therapy. Patient reports this therapy is effective.     Back Pain  This is a chronic problem. The current episode started more than 1 year ago. The problem occurs constantly. The problem is unchanged. The pain is present in the lumbar spine and sacro-iliac. The quality of the pain is described as aching, burning, shooting and stabbing. The pain radiates to the right thigh and left thigh. The pain is at a severity of 6/10. The pain is moderate. The pain is

## 2025-07-21 NOTE — ASSESSMENT & PLAN NOTE
-Patient is tested according to risk of opioid misuse, office policy, state regulations and/or providers discretion  Patient risk high  UDS or Saliva collected & tested on 7/21/25

## 2025-07-21 NOTE — ASSESSMENT & PLAN NOTE
- discussed SI joint injections with toradol instead of steroid. Patient would like to hold off at this time due to still having some relief from last injections.  - okay to schedule if patient calls back with increasing pain.

## 2025-07-25 ENCOUNTER — TELEPHONE (OUTPATIENT)
Dept: PAIN MANAGEMENT | Age: 55
End: 2025-07-25

## 2025-08-01 ENCOUNTER — TELEPHONE (OUTPATIENT)
Dept: PAIN MANAGEMENT | Age: 55
End: 2025-08-01

## 2025-08-06 DIAGNOSIS — Z79.891 ENCOUNTER FOR MONITORING OPIOID MAINTENANCE THERAPY: ICD-10-CM

## 2025-08-06 DIAGNOSIS — Z51.81 ENCOUNTER FOR MONITORING OPIOID MAINTENANCE THERAPY: ICD-10-CM

## 2025-08-26 DIAGNOSIS — G89.4 CHRONIC PAIN SYNDROME: ICD-10-CM

## 2025-08-26 DIAGNOSIS — G62.9 POLYNEUROPATHY: ICD-10-CM

## 2025-08-26 RX ORDER — IBUPROFEN 800 MG/1
800 TABLET, FILM COATED ORAL EVERY 8 HOURS PRN
Qty: 90 TABLET | Refills: 2 | Status: SHIPPED | OUTPATIENT
Start: 2025-08-26

## 2025-08-26 RX ORDER — TRAMADOL HYDROCHLORIDE 300 MG/1
300 TABLET, EXTENDED RELEASE ORAL DAILY
Qty: 30 TABLET | Refills: 0 | Status: SHIPPED | OUTPATIENT
Start: 2025-09-17 | End: 2025-10-17

## 2025-08-26 RX ORDER — TRAMADOL HYDROCHLORIDE 50 MG/1
50 TABLET ORAL EVERY 6 HOURS PRN
Qty: 120 TABLET | Refills: 0 | Status: SHIPPED | OUTPATIENT
Start: 2025-08-30 | End: 2025-09-29